# Patient Record
Sex: FEMALE | Race: WHITE | NOT HISPANIC OR LATINO | Employment: FULL TIME | URBAN - METROPOLITAN AREA
[De-identification: names, ages, dates, MRNs, and addresses within clinical notes are randomized per-mention and may not be internally consistent; named-entity substitution may affect disease eponyms.]

---

## 2021-03-22 ENCOUNTER — HOSPITAL ENCOUNTER (OUTPATIENT)
Facility: CLINIC | Age: 26
End: 2021-03-22
Admitting: OBSTETRICS & GYNECOLOGY
Payer: COMMERCIAL

## 2021-04-30 DIAGNOSIS — Z20.822 ENCOUNTER FOR LABORATORY TESTING FOR COVID-19 VIRUS: Primary | ICD-10-CM

## 2021-05-01 ENCOUNTER — HEALTH MAINTENANCE LETTER (OUTPATIENT)
Age: 26
End: 2021-05-01

## 2021-05-02 DIAGNOSIS — Z20.822 ENCOUNTER FOR LABORATORY TESTING FOR COVID-19 VIRUS: ICD-10-CM

## 2021-05-02 LAB
SARS-COV-2 RNA RESP QL NAA+PROBE: NORMAL
SPECIMEN SOURCE: NORMAL

## 2021-05-02 PROCEDURE — U0005 INFEC AGEN DETEC AMPLI PROBE: HCPCS | Performed by: OBSTETRICS & GYNECOLOGY

## 2021-05-02 PROCEDURE — U0003 INFECTIOUS AGENT DETECTION BY NUCLEIC ACID (DNA OR RNA); SEVERE ACUTE RESPIRATORY SYNDROME CORONAVIRUS 2 (SARS-COV-2) (CORONAVIRUS DISEASE [COVID-19]), AMPLIFIED PROBE TECHNIQUE, MAKING USE OF HIGH THROUGHPUT TECHNOLOGIES AS DESCRIBED BY CMS-2020-01-R: HCPCS | Performed by: OBSTETRICS & GYNECOLOGY

## 2021-05-03 LAB
LABORATORY COMMENT REPORT: NORMAL
SARS-COV-2 RNA RESP QL NAA+PROBE: NEGATIVE
SPECIMEN SOURCE: NORMAL

## 2021-05-04 ENCOUNTER — HOSPITAL ENCOUNTER (INPATIENT)
Facility: CLINIC | Age: 26
LOS: 4 days | Discharge: HOME | End: 2021-05-08
Attending: OBSTETRICS & GYNECOLOGY | Admitting: OBSTETRICS & GYNECOLOGY
Payer: COMMERCIAL

## 2021-05-04 DIAGNOSIS — Z98.891 S/P PRIMARY LOW TRANSVERSE C-SECTION: Primary | ICD-10-CM

## 2021-05-04 LAB
ABO + RH BLD: NORMAL
ABO + RH BLD: NORMAL
BASOPHILS # BLD AUTO: 0.1 10E9/L (ref 0–0.2)
BASOPHILS NFR BLD AUTO: 0.5 %
BLD GP AB SCN SERPL QL: NORMAL
BLOOD BANK CMNT PATIENT-IMP: NORMAL
DIFFERENTIAL METHOD BLD: ABNORMAL
EOSINOPHIL # BLD AUTO: 0.2 10E9/L (ref 0–0.7)
EOSINOPHIL NFR BLD AUTO: 1.9 %
ERYTHROCYTE [DISTWIDTH] IN BLOOD BY AUTOMATED COUNT: 12.8 % (ref 10–15)
HCT VFR BLD AUTO: 42.2 % (ref 35–47)
HGB BLD-MCNC: 14.4 G/DL (ref 11.7–15.7)
IMM GRANULOCYTES # BLD: 0.1 10E9/L (ref 0–0.4)
IMM GRANULOCYTES NFR BLD: 1 %
LYMPHOCYTES # BLD AUTO: 2 10E9/L (ref 0.8–5.3)
LYMPHOCYTES NFR BLD AUTO: 19.7 %
MCH RBC QN AUTO: 31.7 PG (ref 26.5–33)
MCHC RBC AUTO-ENTMCNC: 34.1 G/DL (ref 31.5–36.5)
MCV RBC AUTO: 93 FL (ref 78–100)
MONOCYTES # BLD AUTO: 0.6 10E9/L (ref 0–1.3)
MONOCYTES NFR BLD AUTO: 6.2 %
NEUTROPHILS # BLD AUTO: 7 10E9/L (ref 1.6–8.3)
NEUTROPHILS NFR BLD AUTO: 70.7 %
NRBC # BLD AUTO: 0 10*3/UL
NRBC BLD AUTO-RTO: 0 /100
PLATELET # BLD AUTO: 120 10E9/L (ref 150–450)
RBC # BLD AUTO: 4.54 10E12/L (ref 3.8–5.2)
SPECIMEN EXP DATE BLD: NORMAL
WBC # BLD AUTO: 9.9 10E9/L (ref 4–11)

## 2021-05-04 PROCEDURE — 86780 TREPONEMA PALLIDUM: CPT | Performed by: OBSTETRICS & GYNECOLOGY

## 2021-05-04 PROCEDURE — 120N000001 HC R&B MED SURG/OB

## 2021-05-04 PROCEDURE — 3E0P7VZ INTRODUCTION OF HORMONE INTO FEMALE REPRODUCTIVE, VIA NATURAL OR ARTIFICIAL OPENING: ICD-10-PCS | Performed by: OBSTETRICS & GYNECOLOGY

## 2021-05-04 PROCEDURE — 250N000013 HC RX MED GY IP 250 OP 250 PS 637: Performed by: OBSTETRICS & GYNECOLOGY

## 2021-05-04 PROCEDURE — 86900 BLOOD TYPING SEROLOGIC ABO: CPT | Performed by: OBSTETRICS & GYNECOLOGY

## 2021-05-04 PROCEDURE — 86901 BLOOD TYPING SEROLOGIC RH(D): CPT | Performed by: OBSTETRICS & GYNECOLOGY

## 2021-05-04 PROCEDURE — 36415 COLL VENOUS BLD VENIPUNCTURE: CPT | Performed by: OBSTETRICS & GYNECOLOGY

## 2021-05-04 PROCEDURE — 10907ZC DRAINAGE OF AMNIOTIC FLUID, THERAPEUTIC FROM PRODUCTS OF CONCEPTION, VIA NATURAL OR ARTIFICIAL OPENING: ICD-10-PCS | Performed by: OBSTETRICS & GYNECOLOGY

## 2021-05-04 PROCEDURE — 85025 COMPLETE CBC W/AUTO DIFF WBC: CPT | Performed by: OBSTETRICS & GYNECOLOGY

## 2021-05-04 PROCEDURE — 86850 RBC ANTIBODY SCREEN: CPT | Performed by: OBSTETRICS & GYNECOLOGY

## 2021-05-04 RX ORDER — PRENATAL VIT/IRON FUM/FOLIC AC 27MG-0.8MG
1 TABLET ORAL DAILY
COMMUNITY

## 2021-05-04 RX ORDER — OXYTOCIN 10 [USP'U]/ML
10 INJECTION, SOLUTION INTRAMUSCULAR; INTRAVENOUS
Status: DISCONTINUED | OUTPATIENT
Start: 2021-05-04 | End: 2021-05-06

## 2021-05-04 RX ORDER — RIBOFLAVIN (VITAMIN B2) 100 MG
100 TABLET ORAL 3 TIMES DAILY
COMMUNITY

## 2021-05-04 RX ORDER — ACETAMINOPHEN 325 MG/1
650 TABLET ORAL EVERY 4 HOURS PRN
Status: DISCONTINUED | OUTPATIENT
Start: 2021-05-04 | End: 2021-05-06

## 2021-05-04 RX ORDER — METHYLERGONOVINE MALEATE 0.2 MG/ML
200 INJECTION INTRAVENOUS
Status: COMPLETED | OUTPATIENT
Start: 2021-05-04 | End: 2021-05-06

## 2021-05-04 RX ORDER — IBUPROFEN 400 MG/1
800 TABLET, FILM COATED ORAL
Status: DISCONTINUED | OUTPATIENT
Start: 2021-05-04 | End: 2021-05-06

## 2021-05-04 RX ORDER — HYDROXYZINE HYDROCHLORIDE 50 MG/1
100 TABLET, FILM COATED ORAL
Status: DISCONTINUED | OUTPATIENT
Start: 2021-05-04 | End: 2021-05-08 | Stop reason: HOSPADM

## 2021-05-04 RX ORDER — NALOXONE HYDROCHLORIDE 0.4 MG/ML
0.4 INJECTION, SOLUTION INTRAMUSCULAR; INTRAVENOUS; SUBCUTANEOUS
Status: DISCONTINUED | OUTPATIENT
Start: 2021-05-04 | End: 2021-05-06

## 2021-05-04 RX ORDER — CARBOPROST TROMETHAMINE 250 UG/ML
250 INJECTION, SOLUTION INTRAMUSCULAR
Status: DISCONTINUED | OUTPATIENT
Start: 2021-05-04 | End: 2021-05-06

## 2021-05-04 RX ORDER — SODIUM CHLORIDE, SODIUM LACTATE, POTASSIUM CHLORIDE, CALCIUM CHLORIDE 600; 310; 30; 20 MG/100ML; MG/100ML; MG/100ML; MG/100ML
INJECTION, SOLUTION INTRAVENOUS CONTINUOUS
Status: DISCONTINUED | OUTPATIENT
Start: 2021-05-04 | End: 2021-05-06

## 2021-05-04 RX ORDER — OXYTOCIN/0.9 % SODIUM CHLORIDE 30/500 ML
100-340 PLASTIC BAG, INJECTION (ML) INTRAVENOUS CONTINUOUS PRN
Status: COMPLETED | OUTPATIENT
Start: 2021-05-04 | End: 2021-05-06

## 2021-05-04 RX ORDER — NALOXONE HYDROCHLORIDE 0.4 MG/ML
0.2 INJECTION, SOLUTION INTRAMUSCULAR; INTRAVENOUS; SUBCUTANEOUS
Status: DISCONTINUED | OUTPATIENT
Start: 2021-05-04 | End: 2021-05-06

## 2021-05-04 RX ORDER — TRANEXAMIC ACID 10 MG/ML
1 INJECTION, SOLUTION INTRAVENOUS EVERY 30 MIN PRN
Status: DISCONTINUED | OUTPATIENT
Start: 2021-05-04 | End: 2021-05-06

## 2021-05-04 RX ORDER — FENTANYL CITRATE 50 UG/ML
50-100 INJECTION, SOLUTION INTRAMUSCULAR; INTRAVENOUS
Status: DISCONTINUED | OUTPATIENT
Start: 2021-05-04 | End: 2021-05-06

## 2021-05-04 RX ORDER — ONDANSETRON 2 MG/ML
4 INJECTION INTRAMUSCULAR; INTRAVENOUS EVERY 6 HOURS PRN
Status: DISCONTINUED | OUTPATIENT
Start: 2021-05-04 | End: 2021-05-06

## 2021-05-04 RX ORDER — OXYCODONE AND ACETAMINOPHEN 5; 325 MG/1; MG/1
1 TABLET ORAL
Status: DISCONTINUED | OUTPATIENT
Start: 2021-05-04 | End: 2021-05-06

## 2021-05-04 RX ORDER — LIDOCAINE 40 MG/G
CREAM TOPICAL
Status: DISCONTINUED | OUTPATIENT
Start: 2021-05-04 | End: 2021-05-06

## 2021-05-04 RX ADMIN — DINOPROSTONE 10 MG: 10 INSERT VAGINAL at 21:47

## 2021-05-04 RX ADMIN — HYDROXYZINE HYDROCHLORIDE 100 MG: 50 TABLET, FILM COATED ORAL at 23:58

## 2021-05-05 ENCOUNTER — ANESTHESIA (OUTPATIENT)
Dept: OBGYN | Facility: CLINIC | Age: 26
End: 2021-05-05
Payer: COMMERCIAL

## 2021-05-05 ENCOUNTER — ANESTHESIA EVENT (OUTPATIENT)
Dept: OBGYN | Facility: CLINIC | Age: 26
End: 2021-05-05
Payer: COMMERCIAL

## 2021-05-05 LAB — T PALLIDUM AB SER QL: NONREACTIVE

## 2021-05-05 PROCEDURE — 120N000001 HC R&B MED SURG/OB

## 2021-05-05 PROCEDURE — 250N000011 HC RX IP 250 OP 636: Performed by: ANESTHESIOLOGY

## 2021-05-05 PROCEDURE — 258N000003 HC RX IP 258 OP 636: Performed by: ANESTHESIOLOGY

## 2021-05-05 PROCEDURE — 258N000003 HC RX IP 258 OP 636: Performed by: OBSTETRICS & GYNECOLOGY

## 2021-05-05 PROCEDURE — 3E033VJ INTRODUCTION OF OTHER HORMONE INTO PERIPHERAL VEIN, PERCUTANEOUS APPROACH: ICD-10-PCS | Performed by: OBSTETRICS & GYNECOLOGY

## 2021-05-05 PROCEDURE — 00HU33Z INSERTION OF INFUSION DEVICE INTO SPINAL CANAL, PERCUTANEOUS APPROACH: ICD-10-PCS | Performed by: ANESTHESIOLOGY

## 2021-05-05 PROCEDURE — 3E0R3BZ INTRODUCTION OF ANESTHETIC AGENT INTO SPINAL CANAL, PERCUTANEOUS APPROACH: ICD-10-PCS | Performed by: ANESTHESIOLOGY

## 2021-05-05 PROCEDURE — 250N000009 HC RX 250: Performed by: OBSTETRICS & GYNECOLOGY

## 2021-05-05 PROCEDURE — 250N000009 HC RX 250: Performed by: ANESTHESIOLOGY

## 2021-05-05 RX ORDER — OXYTOCIN/0.9 % SODIUM CHLORIDE 30/500 ML
1-24 PLASTIC BAG, INJECTION (ML) INTRAVENOUS CONTINUOUS
Status: DISCONTINUED | OUTPATIENT
Start: 2021-05-05 | End: 2021-05-06

## 2021-05-05 RX ORDER — ROPIVACAINE HYDROCHLORIDE 2 MG/ML
INJECTION, SOLUTION EPIDURAL; INFILTRATION; PERINEURAL PRN
Status: DISCONTINUED | OUTPATIENT
Start: 2021-05-05 | End: 2021-05-06

## 2021-05-05 RX ORDER — ROPIVACAINE HYDROCHLORIDE 2 MG/ML
10 INJECTION, SOLUTION EPIDURAL; INFILTRATION; PERINEURAL ONCE
Status: DISCONTINUED | OUTPATIENT
Start: 2021-05-05 | End: 2021-05-06

## 2021-05-05 RX ORDER — ONDANSETRON 2 MG/ML
4 INJECTION INTRAMUSCULAR; INTRAVENOUS EVERY 6 HOURS PRN
Status: DISCONTINUED | OUTPATIENT
Start: 2021-05-05 | End: 2021-05-06

## 2021-05-05 RX ORDER — FENTANYL CITRATE 50 UG/ML
100 INJECTION, SOLUTION INTRAMUSCULAR; INTRAVENOUS ONCE
Status: DISCONTINUED | OUTPATIENT
Start: 2021-05-05 | End: 2021-05-06

## 2021-05-05 RX ORDER — ONDANSETRON 4 MG/1
4 TABLET, ORALLY DISINTEGRATING ORAL EVERY 6 HOURS PRN
Status: DISCONTINUED | OUTPATIENT
Start: 2021-05-05 | End: 2021-05-06

## 2021-05-05 RX ORDER — NALBUPHINE HYDROCHLORIDE 10 MG/ML
2.5-5 INJECTION, SOLUTION INTRAMUSCULAR; INTRAVENOUS; SUBCUTANEOUS EVERY 6 HOURS PRN
Status: DISCONTINUED | OUTPATIENT
Start: 2021-05-05 | End: 2021-05-06

## 2021-05-05 RX ORDER — LIDOCAINE HYDROCHLORIDE AND EPINEPHRINE 15; 5 MG/ML; UG/ML
INJECTION, SOLUTION EPIDURAL PRN
Status: DISCONTINUED | OUTPATIENT
Start: 2021-05-05 | End: 2021-05-06

## 2021-05-05 RX ORDER — LIDOCAINE HYDROCHLORIDE AND EPINEPHRINE 15; 5 MG/ML; UG/ML
3 INJECTION, SOLUTION EPIDURAL
Status: DISCONTINUED | OUTPATIENT
Start: 2021-05-05 | End: 2021-05-06

## 2021-05-05 RX ORDER — LIDOCAINE 40 MG/G
CREAM TOPICAL
Status: DISCONTINUED | OUTPATIENT
Start: 2021-05-05 | End: 2021-05-06

## 2021-05-05 RX ORDER — FENTANYL CITRATE 50 UG/ML
INJECTION, SOLUTION INTRAMUSCULAR; INTRAVENOUS PRN
Status: DISCONTINUED | OUTPATIENT
Start: 2021-05-05 | End: 2021-05-06

## 2021-05-05 RX ORDER — EPHEDRINE SULFATE 50 MG/ML
5 INJECTION, SOLUTION INTRAMUSCULAR; INTRAVENOUS; SUBCUTANEOUS
Status: DISCONTINUED | OUTPATIENT
Start: 2021-05-05 | End: 2021-05-06

## 2021-05-05 RX ADMIN — SODIUM CHLORIDE, POTASSIUM CHLORIDE, SODIUM LACTATE AND CALCIUM CHLORIDE: 600; 310; 30; 20 INJECTION, SOLUTION INTRAVENOUS at 15:00

## 2021-05-05 RX ADMIN — Medication 12 ML/HR: at 16:31

## 2021-05-05 RX ADMIN — LIDOCAINE HYDROCHLORIDE AND EPINEPHRINE 3 ML: 15; 5 INJECTION, SOLUTION EPIDURAL at 16:29

## 2021-05-05 RX ADMIN — FENTANYL CITRATE 100 MCG: 50 INJECTION, SOLUTION INTRAMUSCULAR; INTRAVENOUS at 16:31

## 2021-05-05 RX ADMIN — Medication 12 ML/HR: at 23:08

## 2021-05-05 RX ADMIN — SODIUM CHLORIDE, POTASSIUM CHLORIDE, SODIUM LACTATE AND CALCIUM CHLORIDE 1000 ML: 600; 310; 30; 20 INJECTION, SOLUTION INTRAVENOUS at 17:24

## 2021-05-05 RX ADMIN — ROPIVACAINE HYDROCHLORIDE 4 ML: 2 INJECTION, SOLUTION EPIDURAL; INFILTRATION at 16:33

## 2021-05-05 RX ADMIN — ROPIVACAINE HYDROCHLORIDE 4 ML: 2 INJECTION, SOLUTION EPIDURAL; INFILTRATION at 16:31

## 2021-05-05 RX ADMIN — Medication 2 MILLI-UNITS/MIN: at 09:38

## 2021-05-05 RX ADMIN — SODIUM CHLORIDE, POTASSIUM CHLORIDE, SODIUM LACTATE AND CALCIUM CHLORIDE: 600; 310; 30; 20 INJECTION, SOLUTION INTRAVENOUS at 09:37

## 2021-05-05 NOTE — H&P
H&P Update    Prenatal chart and nursing notes reviewed, no changes.    24yo  at 41w1d admitted for MIL due to postdates. Pregnancy has been entirely uncomplicated, GBS neg. Rh neg, received Rhogham at 28wks. EFW 8-13. Received Cervidil o/n, cvx this morning 3/70/-2. AROM with clear fluid. Will start pitocin in 1 hour. FHTs Category I. Anticipate .    Treva Vo MD

## 2021-05-05 NOTE — ANESTHESIA PREPROCEDURE EVALUATION
Anesthesia Pre-Procedure Evaluation    Patient: Jhony Daniel   MRN: [id number] : 1995        Preoperative Diagnosis: * No pre-op diagnosis entered *   Procedure : * No procedures listed *     History reviewed. No pertinent past medical history.   Past Surgical History:   Procedure Laterality Date     HEAD & NECK SURGERY      [place] teeth      Allergies   Allergen Reactions     Sulfa Drugs Shortness Of Breath     Amoxicillin Hives     Penicillins Hives      Social History     Tobacco Use     Smoking status: Never Smoker     Smokeless tobacco: Never Used   Substance Use Topics     Alcohol use: Not Currently      Wt Readings from Last 1 Encounters:   No data found for Wt        Anesthesia Evaluation            ROS/MED HX  ENT/Pulmonary:  - neg pulmonary ROS     Neurologic:  - neg neurologic ROS     Cardiovascular:  - neg cardiovascular ROS     METS/Exercise Tolerance:     Hematologic:  - neg hematologic  ROS     Musculoskeletal:  - neg musculoskeletal ROS     GI/Hepatic:  - neg GI/hepatic ROS     Renal/Genitourinary: Comment: Failure to progress - going to C/S      Endo:  - neg endo ROS     Psychiatric/Substance Use:  - neg psychiatric ROS     Infectious Disease:  - neg infectious disease ROS     Malignancy:  - neg malignancy ROS     Other:  - neg other ROS          Physical Exam    Airway        Mallampati: II   TM distance: > 3 FB   Neck ROM: full   Mouth opening: > 3 cm    Respiratory Devices and Support         Dental  no notable dental history         Cardiovascular   cardiovascular exam normal          Pulmonary   pulmonary exam normal                OUTSIDE LABS:  CBC:   Lab Results   Component Value Date    WBC 9.9 2021    HGB 14.4 2021    HCT 42.2 2021     (L) 2021     BMP: No results found for: NA, POTASSIUM, CHLORIDE, CO2, BUN, CR, GLC  COAGS: No results found for: PTT, INR, FIBR  POC: No results found for: BGM, HCG, HCGS  HEPATIC: No results found for: ALBUMIN,  PROTTOTAL, ALT, AST, GGT, ALKPHOS, BILITOTAL, BILIDIRECT, MATEO  OTHER: No results found for: PH, LACT, A1C, WILLIAM, PHOS, MAG, LIPASE, AMYLASE, TSH, T4, T3, CRP, SED    Anesthesia Plan    ASA Status:  2      Anesthesia Type: Epidural.              Consents    Anesthesia Plan(s) and associated risks, benefits, and realistic alternatives discussed. Questions answered and patient/representative(s) expressed understanding.     - Discussed with:  Patient         Postoperative Care            Comments:                Hugo French MD

## 2021-05-05 NOTE — PROGRESS NOTES
OB Progress Note    Very comfortable w/ epidural. Continues to leak clear fluid. Pitocin at 8mU/min. FHTs Category I. Now /-2. Will continue to increase pitocin and plan position changes on peanut ball. D/w Dr. Pandey who is on call [facility]. Anticipate .    Treva Vo MD

## 2021-05-05 NOTE — PLAN OF CARE
Pt is a  at 41w1d presenting for cervical ripening overnight for post-dates. Blood type O-, COVID neg, Hep B neg, Rubella immune, GBS neg. Resting comfortably overnight. Contractions are longer lasting at baseline;  seconds. Maternal VSS. Desires a labor epidural for pain relief.    Expecting a boy, plans to breastfeed. Cat I FHR tracing.

## 2021-05-05 NOTE — ANESTHESIA PROCEDURE NOTES
Epidural catheter Procedure Note  Pre-Procedure   Staff -        Anesthesiologist:  Laura Duval MD       Performed By: anesthesiologist       Location: OB       Pre-Anesthestic Checklist: patient identified, IV checked, site marked, risks and benefits discussed, informed consent, monitors and equipment checked, pre-op evaluation and at physician/surgeon's request  Timeout:       Correct Patient: Yes        Correct Procedure: Yes        Correct Site: Yes        Correct Position: Yes   Procedure Documentation  Procedure: epidural catheter       Patient Position: sitting       Skin prep: Betadine       Local skin infiltrated with 1 mL of 1% lidocaine.        Insertion Site: L2-3. (midline approach).       Technique: LORT saline and LORT air        Needle Type: Touhy needle       Needle Gauge: 17.        Needle Length (Inches): 3.5        Catheter: 19 G.         Catheter threaded easily.        # of attempts: 1 and  # of redirects:     Assessment/Narrative         Paresthesias: No.      Test dose of 3 mL lidocaine 1.5% w/ 1:200,000 epinephrine at.         Test dose negative, 3 minutes after injection, for signs of intravascular, subdural, or intrathecal injection.       Insertion/Infusion Method: LORT saline and LORT air       Aspiration negative for Heme or CSF via Epidural Catheter.    Comments:  Pre-procedure time out completed. Patient in sitting position, the lumbar spine was prepped and draped in sterile fashion. The L2/L3 interspace was identified and local anesthetic was injected for local skin infiltration. A 17 G touhy needle was advanced to the epidural space which was confirmed with the loss of resistance technique at 4 cm. A catheter was then advanced easily into the epidural space. The catheter was left at 8 cm at the skin. Negative aspiration of blood and CSF was confirmed. A test dose of 1.5% lidocaine with 1:200,000 epinephrine was injected through the catheter and was negative for intravascular  injection. The site was covered with sterile tegaderm and the catheter was secured with tape.

## 2021-05-05 NOTE — PLAN OF CARE
ROLY Steele arrived to bedside for epidural placement. Pt sitting at edge of bed. Consent signed and questions answered. Timeout done. Epidural placed without complications. Pt assisted to L tilt and blood pressure's cycled per orders. Pt felt good pain relief within 15 min of pump starting.

## 2021-05-05 NOTE — PLAN OF CARE
Dr. Treva Vo at bedside at 0830 to assess patient and discuss plan of care.  SVE done. Cervix 3/70/-2.  Cervidil pulled out at that time.  Pt agrees with plan to start oxytocin.    Oxytocin started for induction at 0935.  Pt already josselyn frequently, every 2-3, but mild in strength.  Oxytocin started at 2 mu.    Category 1 tracing noted.

## 2021-05-05 NOTE — PLAN OF CARE
Patient is doing well.  Pt states that her contractions are getting more intense.  Contractions occurring every 2-3 minutes. Discussed epidural.  Pt aware that she can have one any time now.  Pt is changing her position every 30 minutes or so on her own, from sitting to standing, hands and knees, birthing ball, etc.  She is breathing through contractions and able to relax in between.

## 2021-05-06 PROBLEM — Z98.891 S/P PRIMARY LOW TRANSVERSE C-SECTION: Status: ACTIVE | Noted: 2021-05-06

## 2021-05-06 LAB
BLOOD BANK CMNT PATIENT-IMP: NORMAL
BLOOD BANK CMNT PATIENT-IMP: NORMAL

## 2021-05-06 PROCEDURE — 258N000003 HC RX IP 258 OP 636: Performed by: OBSTETRICS & GYNECOLOGY

## 2021-05-06 PROCEDURE — 250N000011 HC RX IP 250 OP 636: Performed by: OBSTETRICS & GYNECOLOGY

## 2021-05-06 PROCEDURE — 250N000009 HC RX 250: Performed by: OBSTETRICS & GYNECOLOGY

## 2021-05-06 PROCEDURE — 120N000012 HC R&B POSTPARTUM

## 2021-05-06 PROCEDURE — 710N000009 HC RECOVERY PHASE 1, LEVEL 1, PER MIN: Performed by: OBSTETRICS & GYNECOLOGY

## 2021-05-06 PROCEDURE — 86900 BLOOD TYPING SEROLOGIC ABO: CPT | Performed by: OBSTETRICS & GYNECOLOGY

## 2021-05-06 PROCEDURE — 250N000013 HC RX MED GY IP 250 OP 250 PS 637: Performed by: OBSTETRICS & GYNECOLOGY

## 2021-05-06 PROCEDURE — 360N000076 HC SURGERY LEVEL 3, PER MIN: Performed by: OBSTETRICS & GYNECOLOGY

## 2021-05-06 PROCEDURE — 370N000017 HC ANESTHESIA TECHNICAL FEE, PER MIN: Performed by: OBSTETRICS & GYNECOLOGY

## 2021-05-06 PROCEDURE — 272N000001 HC OR GENERAL SUPPLY STERILE: Performed by: OBSTETRICS & GYNECOLOGY

## 2021-05-06 PROCEDURE — 250N000011 HC RX IP 250 OP 636: Performed by: ANESTHESIOLOGY

## 2021-05-06 PROCEDURE — 250N000009 HC RX 250: Performed by: NURSE ANESTHETIST, CERTIFIED REGISTERED

## 2021-05-06 PROCEDURE — 258N000003 HC RX IP 258 OP 636: Performed by: NURSE ANESTHETIST, CERTIFIED REGISTERED

## 2021-05-06 PROCEDURE — 250N000011 HC RX IP 250 OP 636: Performed by: NURSE ANESTHETIST, CERTIFIED REGISTERED

## 2021-05-06 RX ORDER — METOCLOPRAMIDE HYDROCHLORIDE 5 MG/ML
10 INJECTION INTRAMUSCULAR; INTRAVENOUS EVERY 6 HOURS PRN
Status: DISCONTINUED | OUTPATIENT
Start: 2021-05-06 | End: 2021-05-08 | Stop reason: HOSPADM

## 2021-05-06 RX ORDER — NALBUPHINE HYDROCHLORIDE 10 MG/ML
2.5-5 INJECTION, SOLUTION INTRAMUSCULAR; INTRAVENOUS; SUBCUTANEOUS EVERY 6 HOURS PRN
Status: DISCONTINUED | OUTPATIENT
Start: 2021-05-06 | End: 2021-05-06

## 2021-05-06 RX ORDER — MODIFIED LANOLIN
OINTMENT (GRAM) TOPICAL
Status: DISCONTINUED | OUTPATIENT
Start: 2021-05-06 | End: 2021-05-08 | Stop reason: HOSPADM

## 2021-05-06 RX ORDER — PROCHLORPERAZINE 25 MG
25 SUPPOSITORY, RECTAL RECTAL EVERY 12 HOURS PRN
Status: DISCONTINUED | OUTPATIENT
Start: 2021-05-06 | End: 2021-05-08 | Stop reason: HOSPADM

## 2021-05-06 RX ORDER — AMOXICILLIN 250 MG
2 CAPSULE ORAL 2 TIMES DAILY
Status: DISCONTINUED | OUTPATIENT
Start: 2021-05-06 | End: 2021-05-08 | Stop reason: HOSPADM

## 2021-05-06 RX ORDER — METHYLERGONOVINE MALEATE 0.2 MG/ML
200 INJECTION INTRAVENOUS
Status: DISCONTINUED | OUTPATIENT
Start: 2021-05-06 | End: 2021-05-08 | Stop reason: HOSPADM

## 2021-05-06 RX ORDER — KETOROLAC TROMETHAMINE 30 MG/ML
INJECTION, SOLUTION INTRAMUSCULAR; INTRAVENOUS
Status: DISCONTINUED
Start: 2021-05-06 | End: 2021-05-06 | Stop reason: HOSPADM

## 2021-05-06 RX ORDER — MISOPROSTOL 200 UG/1
800 TABLET ORAL
Status: DISCONTINUED | OUTPATIENT
Start: 2021-05-06 | End: 2021-05-08 | Stop reason: HOSPADM

## 2021-05-06 RX ORDER — ACETAMINOPHEN 325 MG/1
975 TABLET ORAL EVERY 6 HOURS
Status: DISCONTINUED | OUTPATIENT
Start: 2021-05-06 | End: 2021-05-08 | Stop reason: HOSPADM

## 2021-05-06 RX ORDER — NALOXONE HYDROCHLORIDE 0.4 MG/ML
0.2 INJECTION, SOLUTION INTRAMUSCULAR; INTRAVENOUS; SUBCUTANEOUS
Status: DISCONTINUED | OUTPATIENT
Start: 2021-05-06 | End: 2021-05-06

## 2021-05-06 RX ORDER — AZITHROMYCIN 500 MG/1
500 INJECTION, POWDER, LYOPHILIZED, FOR SOLUTION INTRAVENOUS
Status: DISCONTINUED | OUTPATIENT
Start: 2021-05-06 | End: 2021-05-06

## 2021-05-06 RX ORDER — MORPHINE SULFATE 1 MG/ML
INJECTION, SOLUTION EPIDURAL; INTRATHECAL; INTRAVENOUS PRN
Status: DISCONTINUED | OUTPATIENT
Start: 2021-05-06 | End: 2021-05-06

## 2021-05-06 RX ORDER — NALOXONE HYDROCHLORIDE 0.4 MG/ML
0.4 INJECTION, SOLUTION INTRAMUSCULAR; INTRAVENOUS; SUBCUTANEOUS
Status: DISCONTINUED | OUTPATIENT
Start: 2021-05-06 | End: 2021-05-06

## 2021-05-06 RX ORDER — LIDOCAINE 40 MG/G
CREAM TOPICAL
Status: DISCONTINUED | OUTPATIENT
Start: 2021-05-06 | End: 2021-05-06

## 2021-05-06 RX ORDER — CEFAZOLIN SODIUM 1 G/3ML
1 INJECTION, POWDER, FOR SOLUTION INTRAMUSCULAR; INTRAVENOUS SEE ADMIN INSTRUCTIONS
Status: DISCONTINUED | OUTPATIENT
Start: 2021-05-06 | End: 2021-05-06

## 2021-05-06 RX ORDER — KETOROLAC TROMETHAMINE 30 MG/ML
30 INJECTION, SOLUTION INTRAMUSCULAR; INTRAVENOUS EVERY 6 HOURS
Status: COMPLETED | OUTPATIENT
Start: 2021-05-06 | End: 2021-05-06

## 2021-05-06 RX ORDER — LIDOCAINE 40 MG/G
CREAM TOPICAL
Status: DISCONTINUED | OUTPATIENT
Start: 2021-05-06 | End: 2021-05-08 | Stop reason: HOSPADM

## 2021-05-06 RX ORDER — HYDROCORTISONE 2.5 %
CREAM (GRAM) TOPICAL 3 TIMES DAILY PRN
Status: DISCONTINUED | OUTPATIENT
Start: 2021-05-06 | End: 2021-05-08 | Stop reason: HOSPADM

## 2021-05-06 RX ORDER — BISACODYL 10 MG
10 SUPPOSITORY, RECTAL RECTAL DAILY PRN
Status: DISCONTINUED | OUTPATIENT
Start: 2021-05-08 | End: 2021-05-08 | Stop reason: HOSPADM

## 2021-05-06 RX ORDER — ONDANSETRON 2 MG/ML
4 INJECTION INTRAMUSCULAR; INTRAVENOUS EVERY 6 HOURS PRN
Status: DISCONTINUED | OUTPATIENT
Start: 2021-05-06 | End: 2021-05-08 | Stop reason: HOSPADM

## 2021-05-06 RX ORDER — SODIUM CHLORIDE, SODIUM LACTATE, POTASSIUM CHLORIDE, CALCIUM CHLORIDE 600; 310; 30; 20 MG/100ML; MG/100ML; MG/100ML; MG/100ML
INJECTION, SOLUTION INTRAVENOUS CONTINUOUS
Status: DISCONTINUED | OUTPATIENT
Start: 2021-05-06 | End: 2021-05-06

## 2021-05-06 RX ORDER — CARBOPROST TROMETHAMINE 250 UG/ML
250 INJECTION, SOLUTION INTRAMUSCULAR
Status: DISCONTINUED | OUTPATIENT
Start: 2021-05-06 | End: 2021-05-08 | Stop reason: HOSPADM

## 2021-05-06 RX ORDER — LIDOCAINE HYDROCHLORIDE 20 MG/ML
INJECTION, SOLUTION EPIDURAL; INFILTRATION; INTRACAUDAL; PERINEURAL PRN
Status: DISCONTINUED | OUTPATIENT
Start: 2021-05-06 | End: 2021-05-06

## 2021-05-06 RX ORDER — SIMETHICONE 80 MG
80 TABLET,CHEWABLE ORAL 4 TIMES DAILY PRN
Status: DISCONTINUED | OUTPATIENT
Start: 2021-05-06 | End: 2021-05-08 | Stop reason: HOSPADM

## 2021-05-06 RX ORDER — DIPHENHYDRAMINE HCL 25 MG
25 CAPSULE ORAL EVERY 6 HOURS PRN
Status: DISCONTINUED | OUTPATIENT
Start: 2021-05-06 | End: 2021-05-08 | Stop reason: HOSPADM

## 2021-05-06 RX ORDER — EPHEDRINE SULFATE 50 MG/ML
5 INJECTION, SOLUTION INTRAMUSCULAR; INTRAVENOUS; SUBCUTANEOUS
Status: DISCONTINUED | OUTPATIENT
Start: 2021-05-06 | End: 2021-05-06

## 2021-05-06 RX ORDER — OXYTOCIN/0.9 % SODIUM CHLORIDE 30/500 ML
340 PLASTIC BAG, INJECTION (ML) INTRAVENOUS CONTINUOUS PRN
Status: DISCONTINUED | OUTPATIENT
Start: 2021-05-06 | End: 2021-05-08 | Stop reason: HOSPADM

## 2021-05-06 RX ORDER — IBUPROFEN 400 MG/1
800 TABLET, FILM COATED ORAL EVERY 6 HOURS
Status: DISCONTINUED | OUTPATIENT
Start: 2021-05-06 | End: 2021-05-08 | Stop reason: HOSPADM

## 2021-05-06 RX ORDER — OXYTOCIN/0.9 % SODIUM CHLORIDE 30/500 ML
100 PLASTIC BAG, INJECTION (ML) INTRAVENOUS CONTINUOUS
Status: DISCONTINUED | OUTPATIENT
Start: 2021-05-06 | End: 2021-05-08 | Stop reason: HOSPADM

## 2021-05-06 RX ORDER — CITRIC ACID/SODIUM CITRATE 334-500MG
30 SOLUTION, ORAL ORAL
Status: COMPLETED | OUTPATIENT
Start: 2021-05-06 | End: 2021-05-06

## 2021-05-06 RX ORDER — ACETAMINOPHEN 325 MG/1
975 TABLET ORAL ONCE
Status: COMPLETED | OUTPATIENT
Start: 2021-05-06 | End: 2021-05-06

## 2021-05-06 RX ORDER — DIPHENHYDRAMINE HYDROCHLORIDE 50 MG/ML
25 INJECTION INTRAMUSCULAR; INTRAVENOUS EVERY 6 HOURS PRN
Status: DISCONTINUED | OUTPATIENT
Start: 2021-05-06 | End: 2021-05-08 | Stop reason: HOSPADM

## 2021-05-06 RX ORDER — CEFAZOLIN SODIUM 2 G/100ML
2 INJECTION, SOLUTION INTRAVENOUS
Status: COMPLETED | OUTPATIENT
Start: 2021-05-06 | End: 2021-05-06

## 2021-05-06 RX ORDER — OXYTOCIN/0.9 % SODIUM CHLORIDE 30/500 ML
PLASTIC BAG, INJECTION (ML) INTRAVENOUS
Status: DISCONTINUED
Start: 2021-05-06 | End: 2021-05-06 | Stop reason: HOSPADM

## 2021-05-06 RX ORDER — OXYCODONE HYDROCHLORIDE 5 MG/1
5 TABLET ORAL EVERY 4 HOURS PRN
Status: DISCONTINUED | OUTPATIENT
Start: 2021-05-06 | End: 2021-05-08 | Stop reason: HOSPADM

## 2021-05-06 RX ORDER — TRANEXAMIC ACID 10 MG/ML
1 INJECTION, SOLUTION INTRAVENOUS EVERY 30 MIN PRN
Status: DISCONTINUED | OUTPATIENT
Start: 2021-05-06 | End: 2021-05-08 | Stop reason: HOSPADM

## 2021-05-06 RX ORDER — DEXTROSE, SODIUM CHLORIDE, SODIUM LACTATE, POTASSIUM CHLORIDE, AND CALCIUM CHLORIDE 5; .6; .31; .03; .02 G/100ML; G/100ML; G/100ML; G/100ML; G/100ML
INJECTION, SOLUTION INTRAVENOUS CONTINUOUS
Status: DISCONTINUED | OUTPATIENT
Start: 2021-05-06 | End: 2021-05-08 | Stop reason: HOSPADM

## 2021-05-06 RX ORDER — AMOXICILLIN 250 MG
1 CAPSULE ORAL 2 TIMES DAILY
Status: DISCONTINUED | OUTPATIENT
Start: 2021-05-06 | End: 2021-05-08 | Stop reason: HOSPADM

## 2021-05-06 RX ORDER — OXYTOCIN 10 [USP'U]/ML
10 INJECTION, SOLUTION INTRAMUSCULAR; INTRAVENOUS
Status: DISCONTINUED | OUTPATIENT
Start: 2021-05-06 | End: 2021-05-08 | Stop reason: HOSPADM

## 2021-05-06 RX ADMIN — ACETAMINOPHEN 975 MG: 325 TABLET, FILM COATED ORAL at 19:34

## 2021-05-06 RX ADMIN — SODIUM CHLORIDE, POTASSIUM CHLORIDE, SODIUM LACTATE AND CALCIUM CHLORIDE: 600; 310; 30; 20 INJECTION, SOLUTION INTRAVENOUS at 00:40

## 2021-05-06 RX ADMIN — PHENYLEPHRINE HYDROCHLORIDE 0.5 MCG/KG/MIN: 10 INJECTION INTRAVENOUS at 00:28

## 2021-05-06 RX ADMIN — SODIUM CITRATE AND CITRIC ACID MONOHYDRATE 30 ML: 500; 334 SOLUTION ORAL at 00:20

## 2021-05-06 RX ADMIN — IBUPROFEN 800 MG: 400 TABLET ORAL at 19:35

## 2021-05-06 RX ADMIN — ACETAMINOPHEN 975 MG: 325 TABLET, FILM COATED ORAL at 00:19

## 2021-05-06 RX ADMIN — KETOROLAC TROMETHAMINE 30 MG: 30 INJECTION, SOLUTION INTRAMUSCULAR; INTRAVENOUS at 02:21

## 2021-05-06 RX ADMIN — DIPHENHYDRAMINE HYDROCHLORIDE 25 MG: 50 INJECTION, SOLUTION INTRAMUSCULAR; INTRAVENOUS at 04:30

## 2021-05-06 RX ADMIN — Medication 100 ML/HR: at 06:57

## 2021-05-06 RX ADMIN — CEFAZOLIN SODIUM 2 G: 2 INJECTION, SOLUTION INTRAVENOUS at 00:30

## 2021-05-06 RX ADMIN — KETOROLAC TROMETHAMINE 30 MG: 30 INJECTION, SOLUTION INTRAMUSCULAR; INTRAVENOUS at 14:14

## 2021-05-06 RX ADMIN — TRANEXAMIC ACID 1 G: 10 INJECTION, SOLUTION INTRAVENOUS at 00:49

## 2021-05-06 RX ADMIN — KETOROLAC TROMETHAMINE 30 MG: 30 INJECTION, SOLUTION INTRAMUSCULAR; INTRAVENOUS at 08:17

## 2021-05-06 RX ADMIN — OXYCODONE HYDROCHLORIDE 5 MG: 5 TABLET ORAL at 22:01

## 2021-05-06 RX ADMIN — PHENYLEPHRINE HYDROCHLORIDE 100 MCG: 10 INJECTION INTRAVENOUS at 00:43

## 2021-05-06 RX ADMIN — METHYLERGONOVINE MALEATE 200 MCG: 0.2 INJECTION INTRAVENOUS at 00:49

## 2021-05-06 RX ADMIN — ONDANSETRON 4 MG: 2 INJECTION INTRAMUSCULAR; INTRAVENOUS at 00:48

## 2021-05-06 RX ADMIN — MORPHINE SULFATE 2 MG: 1 INJECTION, SOLUTION EPIDURAL; INTRATHECAL; INTRAVENOUS at 00:50

## 2021-05-06 RX ADMIN — Medication 999 ML/HR: at 00:45

## 2021-05-06 RX ADMIN — LIDOCAINE HYDROCHLORIDE 10 ML: 20 INJECTION, SOLUTION EPIDURAL; INFILTRATION; INTRACAUDAL; PERINEURAL at 00:21

## 2021-05-06 RX ADMIN — PHENYLEPHRINE HYDROCHLORIDE 100 MCG: 10 INJECTION INTRAVENOUS at 00:54

## 2021-05-06 RX ADMIN — SENNOSIDES AND DOCUSATE SODIUM 1 TABLET: 8.6; 5 TABLET ORAL at 19:35

## 2021-05-06 RX ADMIN — SENNOSIDES AND DOCUSATE SODIUM 1 TABLET: 8.6; 5 TABLET ORAL at 08:06

## 2021-05-06 RX ADMIN — ACETAMINOPHEN 975 MG: 325 TABLET, FILM COATED ORAL at 14:14

## 2021-05-06 RX ADMIN — OXYCODONE HYDROCHLORIDE 5 MG: 5 TABLET ORAL at 16:07

## 2021-05-06 RX ADMIN — SIMETHICONE 80 MG: 80 TABLET, CHEWABLE ORAL at 22:05

## 2021-05-06 RX ADMIN — PHENYLEPHRINE HYDROCHLORIDE 200 MCG: 10 INJECTION INTRAVENOUS at 00:58

## 2021-05-06 RX ADMIN — LIDOCAINE HYDROCHLORIDE 5 ML: 20 INJECTION, SOLUTION EPIDURAL; INFILTRATION; INTRACAUDAL; PERINEURAL at 00:27

## 2021-05-06 RX ADMIN — SODIUM CHLORIDE, SODIUM LACTATE, POTASSIUM CHLORIDE, CALCIUM CHLORIDE AND DEXTROSE MONOHYDRATE: 5; 600; 310; 30; 20 INJECTION, SOLUTION INTRAVENOUS at 11:47

## 2021-05-06 RX ADMIN — ACETAMINOPHEN 975 MG: 325 TABLET, FILM COATED ORAL at 08:06

## 2021-05-06 NOTE — PLAN OF CARE
Vital signs stable. Postpartum assessment WDL. Incision SUZAN. Pain controlled with tylenol and toradol. Patient ambulating with assist. Patient denies passing gas. Breastfeeding on cue with some assist. Patient and infant bonding well. Will continue with current plan of care.

## 2021-05-06 NOTE — PLAN OF CARE
Data: Jhony Daniel transferred to [id number] via cart at 0340. Baby transferred via parent's arms.  Action: Receiving unit notified of transfer: Yes. Patient and family notified of room change. Report given to CRISTOPHER Stern RN at 0340. Belongings sent to receiving unit. Accompanied by Registered Nurse. Oriented patient to surroundings. Call light within reach. ID bands double-checked with receiving RN.  Response: Patient tolerated transfer and is stable.

## 2021-05-06 NOTE — PROCEDURES
C/S OP REPORT      Preoperative Diagnosis:   1. 25 year old  at 41+1wks  2. Failure to Progress with arrest of dilation at 7cm      Postoperative Diagnosis:  1. Same  2. Delivery of male infant        Procedure: Primary Low Transverse  section       Surgeon: Lisa Pandey DO      Assistants: TESS Mcintosh      Implants/grafts: None      Specimen: None      Complications: none      Condition: Stable      Anesthesia: Epidural      Findings: Normal appearing BL ovaries and fallopian tubes. Normal appearing uterus.  Male infant in cephalic presentation weighing 8#4oz, Apgars of 8/9.       Indications:  25 year old   presented for MIOL and had failure to progress.      During a preoperative consultation the patient was counseled about the risks and benefits of the procedure and the alternatives to  section. Patient was then further consented about the risks of the procedure to include but not limited to bleeding, infection, injury to other organs and anesthesia and advised that if any one of these things happen she may need further surgery. Patient is also advised of the rare risk of hysterectomy secondary to bleeding. Patient was given the opportunity to have her questions answered prior to the procedure.      OPERATIVE INDICATION AND DESCRIPTION   The patient was taken to the operating room and spinal anesthesia was placed. I placed the fetal pillow with 180ml of saline into the vagina. The patient was prepped and draped in the normal sterile fashion in dorsal supine position with a leftward tilt.      After adequate anesthesia, a horizontal skin incision was made and then carried down to the fascia, which was incised horizontally.  The rectus muscles were freed from the overlying fascia and  in the midline, and the anterior peritoneum opened avoiding the bladder.  A low segment transverse uterine incision was made.The infant was delivered in the cepahlic presentation without  difficulty, and handed off to the team in attendance after a 60 second cord clamping delay.  The infant did well.The placenta delivered with external uterine massage.  The uterus was removed from the abdomen and closed in two layers.  The first layer using 0-Vicryl suture in a running locked fashion.  The second layer using 0-Monocryl in an imbricating fashion was placed incorporating the bladder flap peritoneum with care to avoid injury to the bladder.       There was no extra bleeding noted at any time.  The pelvis was inspected and suction irrigated. The uterus was hemostatic and returned to the abdomen.  The uterus was again inspected and was hemostatic. The anterior peritoneum was hemostatic and closed in a running fashion with 3-0 vicryl suture.  The rectus muscles were approximated with interrupted 0 vicryl sutures placed approximately 2 cm apart and were made hemostatic with electrocautery.  The fascia was closed in running fashion with #0 Vicryl.  The subcutaneous tissue was irrigated and then was closed with 2-0 plain in a running fashion. I irrigated the skin and made the tissue hemostatic with electrocautery. The skin was closed with staples.  Steri strips and a sterile dressing was applied.  Sponge and needle counts were correct.      Quantitative Blood Loss  680.  The patient tolerated the procedure well and was taken to the recovery area in good condition.      Lisa Pandey DO

## 2021-05-06 NOTE — ANESTHESIA POSTPROCEDURE EVALUATION
Patient: Jhony Daniel    Procedure(s):   SECTION    Diagnosis:* No pre-op diagnosis entered *  Diagnosis Additional Information: No value filed.    Anesthesia Type:  Epidural    Note:  Disposition: Inpatient   Postop Pain Control: Uneventful            Sign Out: Well controlled pain   PONV: No   Neuro/Psych: Uneventful            Sign Out: Acceptable/Baseline neuro status   Airway/Respiratory: Uneventful            Sign Out: Acceptable/Baseline resp. status   CV/Hemodynamics: Uneventful            Sign Out: Acceptable CV status; No obvious hypovolemia; No obvious fluid overload   Other NRE: NONE   DID A NON-ROUTINE EVENT OCCUR? No           Last vitals:  Vitals:    21 2345 21 0125 21 0140   BP: 106/51 120/70 94/54   Resp:  16 16   Temp:   37.3  C (99.1  F)   SpO2:  98% 99%       Last vitals prior to Anesthesia Care Transfer:  CRNA VITALS  2021 0053 - 2021 0153      2021             Resp Rate (set):  10          Electronically Signed By: Hugo Knapp DO, DO  May 6, 2021  2:11 AM

## 2021-05-06 NOTE — ANESTHESIA CARE TRANSFER NOTE
Patient: Jhony Daniel    Procedure(s):   SECTION    Diagnosis: * No pre-op diagnosis entered *  Diagnosis Additional Information: No value filed.    Anesthesia Type:   Epidural     Note:    Oropharynx: oropharynx clear of all foreign objects  Level of Consciousness: awake  Oxygen Supplementation: room air    Independent Airway: airway patency satisfactory and stable  Dentition: dentition unchanged  Vital Signs Stable: post-procedure vital signs reviewed and stable  Report to RN Given: handoff report given  Patient transferred to: PACU    Handoff Report: Identifed the Patient, Identified the Reponsible Provider, Reviewed the pertinent medical history, Discussed the surgical course, Reviewed Intra-OP anesthesia mangement and issues during anesthesia, Set expectations for post-procedure period and Allowed opportunity for questions and acknowledgement of understanding      Vitals: (Last set prior to Anesthesia Care Transfer)  CRNA VITALS  2021 0053 - 2021 0134      2021             Resp Rate (set):  10        Electronically Signed By: KENNETH Jerry CRNA  May 6, 2021  1:34 AM

## 2021-05-06 NOTE — PROGRESS NOTES
Progress Note:  Delivery    SUBJECTIVE  POD#0, s/p PLTCS at 41+2 weeks gestation due to arrest of dilation.  Doing well this morning.  Surgery was early this morning so she has not yet ambulated.  Pain is well controlled.  She is tolerating some oral intake.  Baby is in the room with her.  She is breast feeding.  Lochia is minimal.  No other concerns or questions.    OBJECTIVE  /64 (BP Location: Left arm)   Pulse 80   Temp 97.4  F (36.3  C) (Axillary)   Resp 18   LMP 2020 (Approximate)   SpO2 100%   Breastfeeding Unknown   General: Alert and pleasant  Abd: Soft, nontender, fundus firm below the umbilicus  Incision: Bandage c/d/i  Ext: No significant edema or calf tenderness    Recent Results (from the past 2016 hour(s))   Asymptomatic COVID-19 Virus (Coronavirus) by PCR    Collection Time: 21 10:55 AM    Specimen: Nasopharyngeal   Result Value Ref Range    COVID-19 Virus PCR to [facility] - Source Nasopharyngeal     COVID-19 Virus PCR to [facility] - Result       Test received-See reflex to IDDL test SARS CoV2 (COVID-19) Virus RT-PCR   SARS-CoV-2 COVID-19 Virus (Coronavirus) by PCR    Collection Time: 21 10:55 AM    Specimen: Nasopharyngeal   Result Value Ref Range    SARS-CoV-2 Virus Specimen Source Nasopharyngeal     SARS-CoV-2 PCR Result NEGATIVE     SARS-CoV-2 PCR Comment       Testing was performed using the Aptima SARS-CoV-2 Assay on the [facility] Instrument System.   Additional information about this Emergency Use Authorization (EUA) assay can be found via   the Lab Guide.     Treponema Abs w Reflex to RPR and Titer    Collection Time: 21  8:59 PM   Result Value Ref Range    Treponema Antibodies Nonreactive NR^Nonreactive   CBC with platelets differential    Collection Time: 21  8:59 PM   Result Value Ref Range    WBC 9.9 4.0 - 11.0 10e9/L    RBC Count 4.54 3.8 - 5.2 10e12/L    Hemoglobin 14.4 11.7 - 15.7 g/dL    Hematocrit 42.2 35.0 - 47.0 %    MCV 93 78 - 100 fl     MCH 31.7 26.5 - 33.0 pg    MCHC 34.1 31.5 - 36.5 g/dL    RDW 12.8 10.0 - 15.0 %    Platelet Count 120 (L) 150 - 450 10e9/L    Diff Method Automated Method     % Neutrophils 70.7 %    % Lymphocytes 19.7 %    % Monocytes 6.2 %    % Eosinophils 1.9 %    % Basophils 0.5 %    % Immature Granulocytes 1.0 %    Nucleated RBCs 0 0 /100    Absolute Neutrophil 7.0 1.6 - 8.3 10e9/L    Absolute Lymphocytes 2.0 0.8 - 5.3 10e9/L    Absolute Monocytes 0.6 0.0 - 1.3 10e9/L    Absolute Eosinophils 0.2 0.0 - 0.7 10e9/L    Absolute Basophils 0.1 0.0 - 0.2 10e9/L    Abs Immature Granulocytes 0.1 0 - 0.4 10e9/L    Absolute Nucleated RBC 0.0    ABO/Rh type and screen    Collection Time: 21  8:59 PM   Result Value Ref Range    ABO O     RH(D) Neg     Antibody Screen Neg     Test Valid Only At [facility]        Specimen Expires 2021    Rho (D) immune globulin (RhoGam) Lab Study    Collection Time: 21  2:19 AM   Result Value Ref Range    Rhogam Order Order received    ]    ASSESSMENT  Jhony Daniel is a 25 year old  POD# 0 s/p LTCS at 41+2 weeks gestation.  Hemoglobin to be drawn tomorrow morning.      PLAN  1) Routine post-partum/post-operative cares. Encourage ambulation. Consider removing wynn catheter later today vs tomorrow morning. Will wait until tomorrow morning to shower and remove bandage.  2) Rh negative.  Baby is also Rh negative so no Rhogam is needed  3) Plan to check post-op Hgb tomorrow  4) Pertussis vaccine to be given if indicated  5) Anticipate discharge on POD#2 vs POD#3.      Demi Lawler MD  Obstetrics, Gynecology and Infertility

## 2021-05-06 NOTE — PROGRESS NOTES
Pt is a 24yo  at 41+1 for MIL and is s/p cervidil induction  Pt feels same pressure that she has felt for 3 hours  SVE has been 7cm since 7pm  We have adjusted the pitocin and pt had an adequate contraction pattern. I didn't place an internal monitor because her contractions were already Q 2 min. Max pitocin is 15mU  FHR has been 130s reactive, no decels, moderate variability  Pt had been repositioned Q 20-30  Minutes with side laying release on both sides, peanut ball BL, forward leaning inversion and baby still feels OP and her SVE hasn't changed  I dw pt the R/B of a Prim CS and she and her  are in agreement to proceed with a CS. PT was told she can have infection, bleeding with a hemorrhage needing blood transfusion, injury to surrounding structures such as but not limited to bowel, bladder and blood vessels. Consent was signed    Lisa Pandey DO

## 2021-05-06 NOTE — PLAN OF CARE
Vital signs stable. Postpartum assessment WDL. Incision clean, dry, and intact. Pain controlled with tylenol and toradol. Benadryl given for itchiness. Gibbons patent and draining adequate amounts of urine. Tolerating clear liquids and crackers- diet advanced. Breastfeeding on cue with minimal staff assistance. Patient and infant bonding well. Will continue with current plan of care.

## 2021-05-06 NOTE — PLAN OF CARE
SVE at 1910 6/80%/-2.  FHTs 135 moderate variability, occasional mild variable decelerations.  Accelerations absent.   Patient comfortable with epidural.  Pitocin remains at 8 mu.  Dr. Pandey updated over phone at 1915.  Report given to Rosita Rosenbaum RN, at 1920.

## 2021-05-07 LAB — HGB BLD-MCNC: 11.9 G/DL (ref 11.7–15.7)

## 2021-05-07 PROCEDURE — 36415 COLL VENOUS BLD VENIPUNCTURE: CPT | Performed by: OBSTETRICS & GYNECOLOGY

## 2021-05-07 PROCEDURE — 250N000011 HC RX IP 250 OP 636: Performed by: OBSTETRICS & GYNECOLOGY

## 2021-05-07 PROCEDURE — 85018 HEMOGLOBIN: CPT | Performed by: OBSTETRICS & GYNECOLOGY

## 2021-05-07 PROCEDURE — 120N000012 HC R&B POSTPARTUM

## 2021-05-07 PROCEDURE — 250N000013 HC RX MED GY IP 250 OP 250 PS 637: Performed by: OBSTETRICS & GYNECOLOGY

## 2021-05-07 RX ORDER — OXYCODONE HYDROCHLORIDE 5 MG/1
5 TABLET ORAL EVERY 4 HOURS PRN
Qty: 15 TABLET | Refills: 0 | Status: SHIPPED | OUTPATIENT
Start: 2021-05-07 | End: 2022-08-08

## 2021-05-07 RX ORDER — ACETAMINOPHEN 325 MG/1
975 TABLET ORAL EVERY 6 HOURS
Qty: 40 TABLET | Refills: 1 | Status: SHIPPED | OUTPATIENT
Start: 2021-05-07

## 2021-05-07 RX ORDER — IBUPROFEN 800 MG/1
800 TABLET, FILM COATED ORAL EVERY 6 HOURS
Qty: 40 TABLET | Refills: 1 | Status: SHIPPED | OUTPATIENT
Start: 2021-05-07

## 2021-05-07 RX ORDER — ONDANSETRON 4 MG/1
4 TABLET, ORALLY DISINTEGRATING ORAL EVERY 6 HOURS PRN
Status: DISCONTINUED | OUTPATIENT
Start: 2021-05-07 | End: 2021-05-08 | Stop reason: HOSPADM

## 2021-05-07 RX ADMIN — ACETAMINOPHEN 975 MG: 325 TABLET, FILM COATED ORAL at 07:56

## 2021-05-07 RX ADMIN — SENNOSIDES AND DOCUSATE SODIUM 1 TABLET: 8.6; 5 TABLET ORAL at 07:56

## 2021-05-07 RX ADMIN — OXYCODONE HYDROCHLORIDE 5 MG: 5 TABLET ORAL at 03:02

## 2021-05-07 RX ADMIN — OXYCODONE HYDROCHLORIDE 5 MG: 5 TABLET ORAL at 07:21

## 2021-05-07 RX ADMIN — IBUPROFEN 800 MG: 400 TABLET ORAL at 13:51

## 2021-05-07 RX ADMIN — ACETAMINOPHEN 975 MG: 325 TABLET, FILM COATED ORAL at 20:49

## 2021-05-07 RX ADMIN — OXYCODONE HYDROCHLORIDE 5 MG: 5 TABLET ORAL at 11:49

## 2021-05-07 RX ADMIN — IBUPROFEN 800 MG: 400 TABLET ORAL at 20:49

## 2021-05-07 RX ADMIN — ONDANSETRON 4 MG: 4 TABLET, ORALLY DISINTEGRATING ORAL at 11:44

## 2021-05-07 RX ADMIN — SENNOSIDES AND DOCUSATE SODIUM 1 TABLET: 8.6; 5 TABLET ORAL at 20:49

## 2021-05-07 RX ADMIN — ACETAMINOPHEN 975 MG: 325 TABLET, FILM COATED ORAL at 15:25

## 2021-05-07 RX ADMIN — OXYCODONE HYDROCHLORIDE 5 MG: 5 TABLET ORAL at 23:21

## 2021-05-07 RX ADMIN — IBUPROFEN 800 MG: 400 TABLET ORAL at 07:56

## 2021-05-07 RX ADMIN — IBUPROFEN 800 MG: 400 TABLET ORAL at 02:25

## 2021-05-07 RX ADMIN — ACETAMINOPHEN 975 MG: 325 TABLET, FILM COATED ORAL at 02:25

## 2021-05-07 NOTE — PLAN OF CARE
Vital signs stable. Postpartum assessment WDL. Incision clean, dry, and intact. Pain controlled with tylenol, ibuprofen, and 5 mg of oxycodone. Using lanolin for nipple tenderness and ice pack to incision for comfort. Patient up ad crystal and voiding without difficulty. Breastfeeding on cue independently/with minimal staff assistance. Patient and infant bonding well. Will continue with current plan of care.

## 2021-05-07 NOTE — LACTATION NOTE
Routine visit with Jhony, FOB and baby.  Baby latched on well to the right breast.  Nose to nipple and deep latch obtained, lips flanged.   Discussed cluster feeding.  Outpatient resources reviewed.  No further questions at this time. Will follow as needed. Scarlett Cee BSN, RN, PHN, RNC-MNN, IBCLC

## 2021-05-07 NOTE — PROGRESS NOTES
PPD#1    S: Doing well. Pain controlled. Voiding and ambulating. Tolerating regular diet, +Flatus.    O: /70 (BP Location: Left arm)   Pulse 82   Temp 97.5  F (36.4  C) (Oral)   Resp 18   LMP 07/21/2020 (Approximate)   SpO2 99%   Breastfeeding Unknown   Gen - NAD  Abd - FF, NT  Inc - C/D/I  Ext - NT    Hgb 11.9    A/P: 26yo PPD#1 s/p LTCS  1. Routine cares  2. Enc ambulation  3. Possible discharge tomorrow, orders done    Treva Vo MD

## 2021-05-07 NOTE — PLAN OF CARE
Patient has stable vitals.  Fundus firm at U/1. Scant rubra flow.  Has voided once since wynn removed at 1400.  Pain well controlled with scheduled tylenol and prn ibuprofen and oxycodone.  Using ice pack prn to incision area.  Continue to work on breast feeding.

## 2021-05-07 NOTE — LACTATION NOTE
"Initial visit with Jhony, EBONY, and baby Mickey. Mickey has been breastfeeding well per Primary RN but has also been spitty. Last emesis contained a lot of colostrum. At time of LC visit, infant had just finished a feeding. Infant was gagging, no emesis, but did review bulb suction technique and how to position infant. Encouraged Jhony to call for assistance with latch/positioning if needed.      Highlighted breast feeding section in our \"Guide to Postpartum and  Care.\" Emphasized importance of skin to skin for enhancing early breastfeeding success!    Discussed  breastfeeding basics:   1) Watch for early feeding cues (licking lips, stirring or rooting, sucking movement with mouth, hands to mouth)  2) Feed infant on demand, a minimum of 8 times in 24 hours (recommended waking infant if it's been 3 hours since last feeding)  3) Techniques to waking a sleepy baby to nurse: (undress infant, change diaper if necessary, gently stroking bottom of feet and back, snuggling infant skin to skin, expressing colostrum).      Parents educated to \"typical\"  feeding patterns/behavior: Day 1 sleepiness (birthday nap) through cluster-feeding on day (night) 2. Educated on nutritive vs non-nutritive suckling patterns. Showed how to record infant feedings along with voids and stools in the provided feeding log.       Appreciative of visit.    Laila Awad RN, IBCLC            "

## 2021-05-07 NOTE — PLAN OF CARE
Patient complains of nausea this morning after taking ibuprofen and tylenol.  ODT zofran given with relief.  Patient also taking oxycodone PO for pain control.  Encouraged patient to eat when taking medication.  Patient up and showered and tolerated well.  Incisional dressing removed.  Scant amount of bleeding on left side of incision noted.  Clean pad applied to area.  Will continue to monitor. Assisted with breast feeding positioning prn.  Encouraged to call with questions/concerns.

## 2021-05-08 VITALS
TEMPERATURE: 98 F | SYSTOLIC BLOOD PRESSURE: 121 MMHG | HEART RATE: 75 BPM | OXYGEN SATURATION: 99 % | DIASTOLIC BLOOD PRESSURE: 77 MMHG | RESPIRATION RATE: 16 BRPM

## 2021-05-08 PROCEDURE — 250N000013 HC RX MED GY IP 250 OP 250 PS 637: Performed by: OBSTETRICS & GYNECOLOGY

## 2021-05-08 RX ORDER — NALOXONE HYDROCHLORIDE 0.4 MG/ML
0.2 INJECTION, SOLUTION INTRAMUSCULAR; INTRAVENOUS; SUBCUTANEOUS
Status: DISCONTINUED | OUTPATIENT
Start: 2021-05-08 | End: 2021-05-08 | Stop reason: HOSPADM

## 2021-05-08 RX ORDER — NALOXONE HYDROCHLORIDE 0.4 MG/ML
0.4 INJECTION, SOLUTION INTRAMUSCULAR; INTRAVENOUS; SUBCUTANEOUS
Status: DISCONTINUED | OUTPATIENT
Start: 2021-05-08 | End: 2021-05-08 | Stop reason: HOSPADM

## 2021-05-08 RX ADMIN — IBUPROFEN 800 MG: 400 TABLET ORAL at 08:35

## 2021-05-08 RX ADMIN — SENNOSIDES AND DOCUSATE SODIUM 2 TABLET: 8.6; 5 TABLET ORAL at 08:36

## 2021-05-08 RX ADMIN — SIMETHICONE 80 MG: 80 TABLET, CHEWABLE ORAL at 08:36

## 2021-05-08 RX ADMIN — IBUPROFEN 800 MG: 400 TABLET ORAL at 02:10

## 2021-05-08 RX ADMIN — ACETAMINOPHEN 975 MG: 325 TABLET, FILM COATED ORAL at 02:10

## 2021-05-08 NOTE — PLAN OF CARE
Jhony doing well; infant cluster feeding through the night. Breasts appear to be filling. Colostrum easily expressed. Minimal c/o discomfort. Burning at incision site when up and moving. Encourage use of abdominal binder. Staples removed. Discharge instructions reviewed; supportive  present.

## 2021-05-08 NOTE — PLAN OF CARE
Vss, RA.  LS clear. BS present. UO adequate.  Incision intact with steri strips.  Breastfeeding frequently overnight.  Tylenol,ibuprofen, and oxycodone controlling pain adequately.   bedside and supportive.

## 2021-05-08 NOTE — PLAN OF CARE
Vital signs are stable. Takes ibuprofen and tylenol for discomfort this shift.   Encouraged patient to eat when taking medication.  Incision is clean dry intact with steri-strip an staples/  Breastfeeding independently.  Will continue to monitor and encouraged to call with questions/concerns.

## 2021-05-08 NOTE — PROGRESS NOTES
POD#2    S: Doing well. Pain controlled. Voiding and ambulating +Flatus, tolerating regular diet.    O: /77   Pulse 75   Temp 98  F (36.7  C) (Oral)   Resp 16   LMP 07/21/2020 (Approximate)   SpO2 99%   Breastfeeding Unknown   Gen - NAD  Abd - FF, NT  Inc - C/D/I  Ext - NT    A/P: 24yo POD#2 s/p LTCS  1. Routine cares  2. Home today, instructions reviewed    Treva Vo MD

## 2021-05-08 NOTE — LACTATION NOTE
Routine visit with Jhony, FOB and baby.    Breastfeeding general information reviewed.   Continues to nurse well per mom and cluster fed all night.  Getting ready for discharge.  Plan: Watch for feeding cues and feed every 2-3 hours and/or on demand. Continue to use feeding log to track intake and appropriate voids and stools. Take feeding log to first follow up appointment or weight check. Encourage skin to skin to promote frequent feedings, thermoregulation and bonding. Follow-up with healthcare provider or lactation consultant for questions or concerns.     Instructed on signs/symptoms of engorgement/ plugged ducts and mastitis.  Instructed on comfort measures and when to call MD.  Outpatient resources given. No further questions at this time.   Will follow as needed.   Scarlett Cee BSN, RN, PHN, RNC-MNN, IBCLC

## 2021-05-10 NOTE — DISCHARGE SUMMARY
Date of Admission: 5/5/2021    Date of Discharge: 5/8/2021    Admission Diagnosis: MIL for post dates    Discharge Diagnosis: same with delivered    Operations/Procedures Performed: Prim LTCS    Complications: none    Hospital Course: The patient was admitted for MIL for postdates. Please see operative report for further details. EBL was 680. Hemoglobin on POD#1 was 11.9. Patient was discharged home on POD# 2. At the time of discharge, she was voiding, ambulating and tolerating a regular diet. Her pain was well controlled with PO pain meds. Staples were removed prior to discharge.    Discharge Plans:  1. Pt was instructed to call with pain not controlled with PO pain meds, temp > 100.4, bleeding > 1 pad/hr for 2hrs, or concerns about her incision.  2. Pt was instructed no lifting > 10lbs x6wks, no driving x2 wks or while on narcotics, and pelvic rest x6 wks.  3. Pt will return to clinic in 2 wks for post-op check and 6 wks for postpartum check.    Lisa Pandey DO

## 2021-09-05 ENCOUNTER — HEALTH MAINTENANCE LETTER (OUTPATIENT)
Age: 26
End: 2021-09-05

## 2022-06-11 ENCOUNTER — HEALTH MAINTENANCE LETTER (OUTPATIENT)
Age: 27
End: 2022-06-11

## 2022-08-08 ENCOUNTER — HOSPITAL ENCOUNTER (EMERGENCY)
Facility: CLINIC | Age: 27
Discharge: HOME | End: 2022-08-08
Attending: PHYSICIAN ASSISTANT | Admitting: PHYSICIAN ASSISTANT
Payer: COMMERCIAL

## 2022-08-08 VITALS
OXYGEN SATURATION: 100 % | WEIGHT: 103.2 LBS | RESPIRATION RATE: 16 BRPM | DIASTOLIC BLOOD PRESSURE: 83 MMHG | HEART RATE: 116 BPM | SYSTOLIC BLOOD PRESSURE: 136 MMHG | BODY MASS INDEX: 18.99 KG/M2 | HEIGHT: 62 IN | TEMPERATURE: 99 F

## 2022-08-08 DIAGNOSIS — G47.00 INSOMNIA, UNSPECIFIED TYPE: ICD-10-CM

## 2022-08-08 DIAGNOSIS — F42.9 OBSESSIVE-COMPULSIVE DISORDER, UNSPECIFIED TYPE: ICD-10-CM

## 2022-08-08 DIAGNOSIS — F41.1 GAD (GENERALIZED ANXIETY DISORDER): ICD-10-CM

## 2022-08-08 LAB — SARS-COV-2 RNA RESP QL NAA+PROBE: NEGATIVE

## 2022-08-08 PROCEDURE — U0005 INFEC AGEN DETEC AMPLI PROBE: HCPCS | Performed by: EMERGENCY MEDICINE

## 2022-08-08 PROCEDURE — U0005 INFEC AGEN DETEC AMPLI PROBE: HCPCS | Performed by: PHYSICIAN ASSISTANT

## 2022-08-08 PROCEDURE — 90791 PSYCH DIAGNOSTIC EVALUATION: CPT

## 2022-08-08 PROCEDURE — 250N000013 HC RX MED GY IP 250 OP 250 PS 637: Performed by: NURSE PRACTITIONER

## 2022-08-08 PROCEDURE — 99284 EMERGENCY DEPT VISIT MOD MDM: CPT | Mod: 25 | Performed by: NURSE PRACTITIONER

## 2022-08-08 PROCEDURE — 99285 EMERGENCY DEPT VISIT HI MDM: CPT | Mod: 25

## 2022-08-08 PROCEDURE — C9803 HOPD COVID-19 SPEC COLLECT: HCPCS

## 2022-08-08 RX ORDER — LORAZEPAM 1 MG/1
1 TABLET ORAL ONCE
Status: COMPLETED | OUTPATIENT
Start: 2022-08-08 | End: 2022-08-08

## 2022-08-08 RX ORDER — LORAZEPAM 0.5 MG/1
.5-1 TABLET ORAL EVERY 6 HOURS PRN
Qty: 14 TABLET | Refills: 0 | Status: SHIPPED | OUTPATIENT
Start: 2022-08-08

## 2022-08-08 RX ORDER — HYDROXYZINE HYDROCHLORIDE 25 MG/1
25-50 TABLET, FILM COATED ORAL EVERY 6 HOURS PRN
Qty: 60 TABLET | Refills: 0 | Status: SHIPPED | OUTPATIENT
Start: 2022-08-08

## 2022-08-08 RX ORDER — QUETIAPINE FUMARATE 50 MG/1
50 TABLET, FILM COATED ORAL
Qty: 30 TABLET | Refills: 0 | Status: SHIPPED | OUTPATIENT
Start: 2022-08-08

## 2022-08-08 RX ORDER — ESCITALOPRAM OXALATE 5 MG/1
5 TABLET ORAL DAILY
Status: DISCONTINUED | OUTPATIENT
Start: 2022-08-09 | End: 2022-08-08

## 2022-08-08 RX ORDER — ESCITALOPRAM OXALATE 5 MG/1
5 TABLET ORAL DAILY
Status: DISCONTINUED | OUTPATIENT
Start: 2022-08-08 | End: 2022-08-09 | Stop reason: HOSPADM

## 2022-08-08 RX ORDER — ESCITALOPRAM OXALATE 5 MG/1
5 TABLET ORAL DAILY
Qty: 30 TABLET | Refills: 0 | Status: SHIPPED | OUTPATIENT
Start: 2022-08-08

## 2022-08-08 RX ADMIN — LORAZEPAM 1 MG: 1 TABLET ORAL at 21:49

## 2022-08-08 RX ADMIN — ESCITALOPRAM 5 MG: 5 TABLET, FILM COATED ORAL at 21:49

## 2022-08-08 ASSESSMENT — ENCOUNTER SYMPTOMS
VOMITING: 0
NERVOUS/ANXIOUS: 1
SHORTNESS OF BREATH: 0
DIFFICULTY URINATING: 0
FEVER: 0
BLOOD IN STOOL: 0

## 2022-08-08 NOTE — ED NOTES
Bed: SHRL02  Expected date: 8/8/22  Expected time: 3:55 PM  Means of arrival:   Comments:  Triage AB maral cortes

## 2022-08-08 NOTE — ED TRIAGE NOTES
Pt presents with concerns for increasing anxiety and depression over last 15 months since birth of child. Pt reports that she has always had anxiety but able to manage without medications. One trial several years ago      Triage Assessment     Row Name 08/08/22 6277       Triage Assessment (Adult)    Airway WDL WDL       Skin Circulation/Temperature WDL    Skin Circulation/Temperature WDL WDL       Cardiac WDL    Cardiac WDL WDL       Cognitive/Neuro/Behavioral WDL    Cognitive/Neuro/Behavioral WDL WDL
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
## 2022-08-08 NOTE — CONSULTS
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 fantasy, many times painful, always changing. By allowing ourselves this simple recognition, we begin to accept that we will never be able to control our experiences, but that we can transform our relationship to them. This changes everything.     Printed from Juanita Frost s book, Real Love.

## 2022-08-08 NOTE — ED PROVIDER NOTES
History     Chief Complaint:  Psychiatric Evaluation     28 y/o female presents with her SO for evaluation of overwhelming anxiety.  This has been ongoing for the past 2 weeks.  Hx of anxiety and did take medication about 5 years ago but none since.  Has 15 month old son.  Past few weeks has felt flat with emotions towards him.  No self harm thoughts or actions or actions or thoughts of harm to others.  Has significant social stressors right now with family members (recently had to cut mother off due to mother's ETOH abuse).     No fevers  No CP/dyspnea  Had upset stomach when traveling a few weeks ago  No melena  No bloody stools  No urinary irritative complaints    Hx of anxiety  Has not been Dx with depression, but this is pts primary concern this evening    PCP established, has appt scheduled for tomorrow  SO at [facility]  Rarely drinks ETOH  No drug use        The history is provided by the patient and medical records. No  was used.      ROS:  Review of Systems   Constitutional: Negative for fever.   Respiratory: Negative for shortness of breath.    Cardiovascular: Negative for chest pain.   Gastrointestinal: Negative for blood in stool and vomiting.   Genitourinary: Negative for difficulty urinating.   Psychiatric/Behavioral: The patient is nervous/anxious.    All other systems reviewed and are negative.    Allergies:  Sulfa Drugs  Amoxicillin  Penicillins     Medications:    acetaminophen (TYLENOL) 325 MG tablet  ibuprofen (ADVIL/MOTRIN) 800 MG tablet  oxyCODONE (ROXICODONE) 5 MG tablet  Prenatal Vit-Fe Fumarate-FA (PRENATAL MULTIVITAMIN W/IRON) 27-0.8 MG tablet  Probiotic Product (PROBIOTIC DAILY PO)  vitamin C (ASCORBIC ACID) 100 MG tablet      Past Medical History:    No past medical history on file.    Past Surgical History:    Past Surgical History:   Procedure Laterality Date      SECTION N/A 2021    Procedure:  SECTION;  Surgeon: Lisa Pandey DO;  Location:  "[facility]     HEAD & NECK SURGERY      [place] teeth      Family History:    family history is not on file.    Social History:   reports that she has never smoked. She has never used smokeless tobacco. She reports previous alcohol use. She reports that she does not use drugs.  PCP: Simran Blake     Physical Exam     Patient Vitals for the past 24 hrs:   BP Temp Temp src Pulse Resp SpO2 Height Weight   08/08/22 1550 (!) 134/100 98.4  F (36.9  C) Oral (!) 125 22 100 % 1.575 m (5' 2\") 47.6 kg (105 lb)      Physical Exam  Vitals and nursing note reviewed.   Constitutional:       General: She is not in acute distress.     Appearance: Normal appearance. She is not ill-appearing, toxic-appearing or diaphoretic.   HENT:      Head: Normocephalic.      Right Ear: External ear normal.      Left Ear: External ear normal.      Mouth/Throat:      Comments: Mask in place, clear speech.   Eyes:      Conjunctiva/sclera: Conjunctivae normal.   Cardiovascular:      Rate and Rhythm: Regular rhythm. Tachycardia present.      Pulses: Normal pulses.      Heart sounds: Normal heart sounds.   Pulmonary:      Effort: Pulmonary effort is normal. No respiratory distress.      Breath sounds: Normal breath sounds.   Abdominal:      General: There is no distension.      Palpations: Abdomen is soft.      Tenderness: There is no abdominal tenderness. There is no guarding or rebound.   Musculoskeletal:         General: Normal range of motion.      Cervical back: Normal range of motion and neck supple. No rigidity.   Skin:     General: Skin is warm.      Capillary Refill: Capillary refill takes less than 2 seconds.   Neurological:      General: No focal deficit present.      Mental Status: She is alert.   Psychiatric:         Mood and Affect: Mood normal.         Behavior: Behavior normal.         Thought Content: Thought content normal.         Judgment: Judgment normal.      Comments: Anxious appearing but cooperative and directable. Not " responding to internal stimuli.        Emergency Department Course     Laboratory:  Labs Ordered and Resulted from Time of ED Arrival to Time of ED Departure   COVID-19 VIRUS (CORONAVIRUS) BY PCR - Normal       Result Value    SARS CoV2 PCR Negative        Emergency Department Course:       Reviewed:  I reviewed nursing notes, vitals and past medical history    Assessments:  : I obtained history and examined the patient as noted above.     Disposition:  The patient was transferred to [facility].     Impression & Plan      Medical Decision Makin y/o female presents with her SO for evaluation of overwhelming anxiety.  This has been ongoing for the past 2 weeks.  Hx of anxiety and did take medication about 5 years ago but none since.  Has 15 month old son.  Past few weeks has felt flat with emotions towards him.  No self harm thoughts or actions or actions or thoughts of harm to others.  Has significant social stressors right now with family members (recently had to cut mother off due to mother's ETOH abuse).     Exam as above.  Anxious appearing but directable.  Vitals noted, suspect tachycardia reflexion of anxiety.  [facility]  She and  are happy with plan.     Diagnosis:  1. Anxiety     2022   Claudia Galindo PA-C Medure, Leah M, PA-C  [phone]

## 2022-08-09 NOTE — DISCHARGE INSTRUCTIONS
Called and spoke with Bernie. She did advise that she found another form that was not included in the original packet, she is faxing to us now.    Medication Instructions:  Daily Anti-Anxiety Medication: escitalopram (lexapro) 5 mg daily.  If GI side effects, or increased physical symptoms of anxiety persist more than 5 days-recommend taking 1/2 a pill for 2.5 mg daily until side effects subside.  -Sleep Regime: Take First: quetiapine (seroquel) 50 mg nightly as needed.  Recommend taking nightly for at least a week to re-establish normal sleep pattern. It is absolutely ok to take it longer! If 50 mg tablet makes you feel too groggy in the morning-the pill can be cut in half to make 25 mg.  -Hydroxyzine (atarax) 25-50 mg every 6 hours as needed for anxiety. A great option to take during the day at the smaller dose. Once again-a 25 mg tablet can be sliced in half for 12.5 mg if feeling oversedated on 25 mg. The larger dose of 50 mg may be helpful at night. Can be taken with Seroquel.  -Lorazepam 1 mg. This is the dose you received on [facility]. Your insurance covers the 0.5 mg tablets so may take 1 or 2 depending on the severity and tolerance of your symptoms.              Aftercare Plan    Follow-up appointments scheduled:    *Please arrive 20 minutes early to your appointments. Remember to bring your insurance card and ID.       If I am feeling unsafe or I am in a crisis, I will:     Contact my established care providers:   Primary Care Doctor: Dr. Simran Blake at [facility] ([phone])    Call the National Suicide Prevention Lifeline (526) or the crisis text line (113820).   Go to the nearest emergency room.   Call [phone].     Warning signs that I or other people might notice when a crisis is developing for me: Feeling anxious, having dark thoughts, difficulty eating enough food or water, lack of sleep.     Things I am able to do on my own to cope or help me feel better:   -Take a deep breath and sit down if needed. Think before acting.   -Download a meditation moraima and spend 15-20 minutes per day mediating/relaxing. Some apps to download  "include Calm, Headspace and Insight Timer. All of these apps have free version.     Things that I am able to do with others to cope or help me better:   -Commit to 30 minutes of self care daily. This can be as simple as taking a shower, going for a walk, cooking a meal, reading, writing, etc.   -Use community resources, including hotline numbers, [facility], and support meetings.     Things I can use or do for distraction:   -Call a friend or family member.   -Spend time outside.   -Go for a walk.   -Exercise  -Do chores.   -Do a project or favorite activity.   -Listen to music.   -Read.   -Journal your feelings.   -Meditate.     Changes I can make to support my mental health and wellness:   -Get at least 6-8 hours of sleep each night.   -Eat 3 nutritious meals per day.   -Take all of your medications as prescribed.   -Attend scheduled mental health therapy and psychiatric appointments and follow all recommendations.   -Maintain a daily schedule/routine.   -Practice deep breathing skills.   -Refrain from taking mood altering chemicals not currently prescribed to me.     People in my life that I can ask for help: My , my father, stepmother, and friends.     Your [facility] has a mental health crisis team you can call 24/7: [facility] Mobile Crisis  [phone]    Crisis Lines  Crisis Text Line  Text 022937  You will be connected with a trained live crisis counselor to provide support.  Por sania, texto  ROD a 131609 o texto a 442-AYUDAME en WhatsApp  The [employer] (LGBTQ Youth Crisis Line)  5.773.539.3714  text START to 443-829    Community Resources  Fast Tracker  Linking people to mental health and substance use disorder resources  fasttrackermn.org   [place] Mental Health Warm Line  Peer to peer support  Monday thru Saturday, 12 pm to 10 pm  702.404.5198 or 0.081.633.5000  Text \"Support\" to 81252  National [place] on Mental Illness (KOLE)  984.657.4785 or 1.888.KOLE.HELPS    Mental " "Health Apps (all of these apps have a free version)  My3  https://myCoinKeeperpp.org/  VirtualHopeBox  https://Glyde/apps/virtual-hope-box/  Calm  Headspace  Insight Timer  Calm Harm    Crisis Lines  Crisis Text Line  Text 791800  You will be connected with a trained live crisis counselor to provide support.  Por espanol, texto  ROD a 439116 o texto a 442-AYUDAME en WhatsApp  National Hope Line  1.800.SUICIDE [9702696]    Community Resources  Fast Tracker  Linking people to mental health and substance use disorder resources  Easy Tempon.org   [place] Mental Health Warm Line  Peer to peer support  Monday thru Saturday, 12 pm to 10 pm  854.641.0994 or 3.316.306.6140  Text \"Support\" to 43670  National [place] on Mental Illness (KOLE)  143.194.9316 or 1.888.KOLE.HELPS    Additional Information  Today you were seen by a licensed mental health professional through Triage and Transition services, [facility] ([facility])  for a crisis assessment in the Emergency Department at [facility].  It is recommended that you follow up with your established providers (psychiatrist, mental health therapist, and/or primary care doctor - as relevant) as soon as possible. Coordinators from [facility] will be calling you in the next 24-48 hours to ensure that you have the resources you need.  You can also contact [facility] coordinators directly at [phone]. You may have been scheduled for or offered an appointment with a mental health provider. [facility] maintains an extensive network of licensed behavioral health providers to connect patients with the services they need.  We do not charge providers a fee to participate in our referral network.  We match patients with providers based on a patient's specific needs, insurance coverage, and location.  Our first effort will be to refer you to a provider within your care system, and will utilize providers outside your care system as needed.         TIPP?stands " for?Temperature,?Intense exercise,?Paced breathing, and?Paired muscle relaxation.     TEMPERATURE     When we re upset, our bodies often feel hot. To counter this, splash your face with cold water, hold an ice cube, or let the car s AC blow on your face. Changing your body temperature will help you cool down--both physically and emotionally.     INTENSE EXERCISE     Do intense exercise to match your intense emotion. You re not a marathon runner? That s okay, you don t need to be. Sprint down to the end of the street, jump in the pool for a few laps, or do jumping jacks until you ve tired yourself out. Increasing oxygen flow helps decrease stress levels. Plus, it s hard to stay dangerously upset when you re exhausted.     PACED BREATHING     Even something as simple as controlling your breath can have a profound impact on reducing emotional pain. There are many different types of breathing exercises. If you have a favorite, breathe it out. If you don t, try a technique called  box breathing . Each breath interval will be four seconds long. Take in air four seconds, hold it in four seconds, breathe out four, and hold four. And then start again. Continue to focus on this breathing pattern until you feel more calm. Steady breathing reduces your body s fight or flight response.     PAIRED MUSCLE RELAXATION     The science of paired muscle relaxation is fascinating. When you tighten a voluntary muscle, relax it, and allow it to rest, the muscle will become more relaxed than it was before it was tightened. Relaxed muscles require less oxygen,?so your breathing and heart rate will slow down. Try this technique by focusing on a group of muscles, such as the muscles in your arms. Tighten the muscles as much as you can for five seconds. Then let go of the tension. Let the muscles relax, and you ll begin to relax, as well.         The RAIN Practice     RAIN is an acronym for a practice specifically geared to ease emotional  confusion and suffering. When a negative or thorny feeling comes up, we pause, remember the four steps cued by the letters, and begin to pay attention in a new way.     R -- Recognize: It is impossible to deal with an emotion--to be resilient in the face of difficulty--unless we acknowledge that we re experiencing it. So the first step is simply to notice what is coming up. Suppose you ve had a conversation with a friend that leaves you feeling queasy or agitated. You don t try to push away or ignore your discomfort. Instead, you look more closely. Oh, you might say to yourself, this feels like anger. Then this might be followed quickly by another thought: And I notice I am judging myself for being angry.     A -- Acknowledge: The second step is an extension of the first--you accept the feeling and allow it to be there. Put another way, you give yourself permission to feel it. You remind yourself that you don t have the power to successfully declare,  I shouldn t have such hateful feelings about a friend,  or  I ve got to be less sensitive.  Sometimes I ask students to imagine each thought and emotion as a visitor knocking at the door of their house. The thoughts don t live there; you can greet them, acknowledge them, and watch them go. Rather than trying to dismiss anger and self-judgment as  bad  or  wrong,  simply rename them as  painful.  This is the entry into self-compassion--you can see your thoughts and emotions arise and create space for them even if they are uncomfortable. You don t take hold of your anger and fixate on it, nor do you treat it as an enemy to be suppressed. It can simply be.     As we get closer to it, an uncomfortable emotion becomes less opaque and solid. We focus less on labeling the discomfort and more on gaining insight.     I -- Investigate: Now you begin to ask questions and explore your emotions with a sense of openness and curiosity. This feels quite different from when we are  fueled by obsessiveness or by a desire for answers or blame. When we re caught up in a reaction, it s easy to fixate on the trigger and say to ourselves,  I m so mad at so-and-so that I m going to tell everyone what he did and destroy him!  rather than examining the emotion itself. There is so much freedom in allowing ourselves to cultivate curiosity and move closer to a feeling, rather than away from it. We might explore how the feeling manifests itself in our bodies and also look at what the feeling contains. Many strong emotions are actually intricate tapestries woven of various strands. Anger, for example, commonly includes moments of sadness, helplessness, and fear. As we get closer to it, an uncomfortable emotion becomes less opaque and solid. We focus less on labeling the discomfort and more on gaining insight. Again, we do not wallow, nor do we repress. Remember that progress doesn t mean that the negative emotions don t come up. It s that instead of feeling hard as steel, they become gauzy, transparent, and available for investigation.     N -- Non-identify: In the final step of RAIN, we consciously avoid being defined by (identified with) a particular feeling, even as we may engage with it. Feeling angry with a particular person, in a particular conversation, about a particular situation is very different from telling yourself,  I am an angry person and always will be.  You permit yourself to see your own anger, your own fear, your own resentment--whatever is there--and instead of spiraling down into judgment ( I m such a terrible person ), you make a gentle observation, something like,  Oh. This is a state of suffering.  This opens the door to a compassionate relationship with yourself, which is the real foundation of a compassionate relationship with others.     By allowing ourselves this simple recognition, we begin to accept that we will never be able to control our experiences, but that we can  transform our relationship to them. This changes everything.     We cannot will what thoughts and feelings arise in us. But we can recognize them as they are--sometimes recurring, sometimes frustrating, sometimes filled with fantasy, many times painful, always changing. By allowing ourselves this simple recognition, we begin to accept that we will never be able to control our experiences, but that we can transform our relationship to them. This changes everything.     Printed from Juanita Frost s book, Real Love.

## 2022-08-09 NOTE — PROGRESS NOTES
Patient agreeable to discharge plan. Discharge instructions reviewed with patient including follow-up care plan. Medications reviewed. Reviewed safety plan and outpatient resources. Denies SI and HI. All belongings that were brought into the hospital have been returned to patient. Escorted off the unit at 2215 accompanied by [facility] staff. Discharged to home via private transportation.

## 2022-08-09 NOTE — ED PROVIDER NOTES
"[facility] - Psychiatric Consultation  [facility] Emergency Department    Jhony Daniel MRN: [id number]   Age: 27 year old YOB: 1995     History     Chief Complaint   Patient presents with     Psychiatric Evaluation     HPI  Jhony Daniel is a 27 year old female with history notable for BRYN and, depression who presents to the ED with insomnia, poor appetite and obsessions in the context of worsening anxiety symptoms further complicated by being 15-month postpartum and experiencing multiple life stressors.  Patient was evaluated by the ED provider, who medically cleared patient to transfer to [facility] for psychiatric assessment, this is reviewed along with all pertinent labs and tests performed.    On examination, patient describes herself as an anxious person, a perfectionist and worrier most her life. She has one past medication trial with Zoloft 5 years ago when she was under considerable stress and experienced similar symptoms, yet not to the same intensity as now.  She states she did not take the medication for an extended period of time as she is sensitive to side effects and generally does not like taking medications. She feels her symptoms became more tolerable as the stress level decreased.    What prompted her admission to the ED was experiencing a vivid vision of her harming her family. This occurred about 5 days ago in her sleep. She has now obsessed over this thought and image worrying she may loose her mind and actually do something. She has not been able to eat or sleep. She feels like she is \"zoning out\" and has difficulty concentrating or thinking of anything else.  She does not endorse any suicidal thoughts. She does not want to harm her family and is seeking mental health advice.      Past Medical History  No past medical history on file.  Past Surgical History:   Procedure Laterality Date      SECTION N/A 2021    Procedure:  SECTION;  Surgeon: Dannie" "Lisa Lucasamarjit DO;  Location:  L+D     HEAD & NECK SURGERY      [place] teeth     acetaminophen (TYLENOL) 325 MG tablet  escitalopram (LEXAPRO) 5 MG tablet  hydrOXYzine (ATARAX) 25 MG tablet  ibuprofen (ADVIL/MOTRIN) 800 MG tablet  LORazepam (ATIVAN) 0.5 MG tablet  Prenatal Vit-Fe Fumarate-FA (PRENATAL MULTIVITAMIN W/IRON) 27-0.8 MG tablet  Probiotic Product (PROBIOTIC DAILY PO)  QUEtiapine (SEROQUEL) 50 MG tablet  vitamin C (ASCORBIC ACID) 100 MG tablet      Allergies   Allergen Reactions     Sulfa Drugs Shortness Of Breath     Amoxicillin Hives     Penicillins Hives     Family History  No family history on file.  Social History   Social History     Tobacco Use     Smoking status: Never Smoker     Smokeless tobacco: Never Used   Substance Use Topics     Alcohol use: Not Currently     Drug use: Never      Past medical history, past surgical history, medications, allergies, family history, and social history were reviewed with the patient. No additional pertinent items.       Review of Systems  A complete review of systems was performed with pertinent positives and negatives noted in the HPI, and all other systems negative.    Physical Examination   BP: (!) 134/100  Pulse: (!) 125  Temp: 98.4  F (36.9  C)  Resp: 22  Height: 157.5 cm (5' 2\")  Weight: 47.6 kg (105 lb)  SpO2: 100 %    Physical Exam  General: Appears stated age.   Neuro: Alert and fully oriented. Extremities appear to demonstrate normal strength on visual inspection.   Integumentary/Skin: no rash visualized, normal color    Psychiatric Examination   Appearance: awake, alert, fatigued and thin  Attitude:  cooperative  Eye Contact:  good  Mood:  anxious  Affect:  intensity is heightened  Speech:  pressured speech and rambling  Psychomotor Behavior:  no evidence of tardive dyskinesia, dystonia, or tics  Thought Process:  logical, linear and goal oriented  Associations:  no loose associations  Thought Content:  no evidence of suicidal ideation or homicidal " ideation, no evidence of psychotic thought and obsessions present  Insight:  good  Judgement:  intact  Oriented to:  time, person, and place  Attention Span and Concentration:  intact  Recent and Remote Memory:  intact  Language: able to name/identify objects without impairment  Fund of Knowledge: intact with awareness of current and past events    ED Course        Labs Ordered and Resulted from Time of ED Arrival to Time of ED Departure   COVID-19 VIRUS (CORONAVIRUS) BY PCR - Normal       Result Value    SARS CoV2 PCR Negative         Assessments & Plan (with Medical Decision Making)   Patient presenting with exacerbation of anxiety symptoms in the context of BRYN with probable OCD traits further complicated by lack of sleep, appetite and external stressors.. Nursing notes reviewed noting no acute issues.     I have reviewed the assessment completed by the [facility].     After a period of working with the treatment team on the [facility] unit, the patient's mental state improved to allow a safe transition to outpatient care. After counseling on the diagnosis, work-up, and treatment plan, the patient was discharged. Close follow-up with a psychiatrist and/or therapist was recommended and community psychiatric resources were provided. Patient is to return to the ED if any urgent or potentially life-threatening concerns.     At the time of discharge, the patient's acute suicide risk was determined to be low due to the following factors: Reduction in the intensity of mood/anxiety symptoms that preceded the admission, denial of suicidal thoughts, denies feeling helpless or helpless, not currently under the influence of alcohol or illicit substances, denies experiencing command hallucinations, no immediate access to firearms. The patient's acute risk could be higher if noncompliant with their treatment plan, medications, follow-up appointments or using illicit substances or alcohol. Protective factors include: social supports,  children, stable housing, employment, Evangelical beliefs.    Preliminary diagnosis:    ICD-10-CM    1. BRYN (generalized anxiety disorder)  F41.1    2. Insomnia, unspecified type  G47.00    3. Obsessive-compulsive disorder, unspecified type  F42.9     Rule Out-requires futher monitoring        Treatment Plan:  -Discharge home with safety plan.  -Start Lexapro 5 mg daily for anxiety. Will start slow at 5 mg and then increase to 10 mg to avoid potential side effects,  -Will give 1 mg ativan now due to heightened anxiety.  -Prescription for ativan 0.5-1 mg every 6 hours as needed. 14 pills provided. For anxiety and sleep.  -Atarax 25-50 mg every 6 hours as needed for anxiety or sleep.  -Seroquel 50 mg nightly as needed for sleep. Encouraged regular use until she feels stabilized.  -Medication education provided this visit includes, rationale for medication, importance of compliance, medication interactions, and common side effects. Patient agreeable.  -Referral for therapy and psychiatry.    --  KENNETH Rodriguez CNP   [facility] EMERGENCY DEPT  [facility] Unit  8/8/2022      Radha Holm APRN CNP  08/09/22 [id number]

## 2022-10-22 ENCOUNTER — HEALTH MAINTENANCE LETTER (OUTPATIENT)
Age: 27
End: 2022-10-22

## 2023-06-01 ENCOUNTER — HEALTH MAINTENANCE LETTER (OUTPATIENT)
Age: 28
End: 2023-06-01

## 2024-06-02 ENCOUNTER — HEALTH MAINTENANCE LETTER (OUTPATIENT)
Age: 29
End: 2024-06-02

## 2025-06-15 ENCOUNTER — HEALTH MAINTENANCE LETTER (OUTPATIENT)
Age: 30
End: 2025-06-15

## (undated) DEVICE — SOL WATER IRRIG 1000ML BOTTLE 07139-09

## (undated) DEVICE — SOL NACL 0.9% IRRIG 1000ML BOTTLE 07138-09

## (undated) DEVICE — DRSG GAUZE 4X4" TOPPER

## (undated) DEVICE — LINEN C-SECTION 5415

## (undated) DEVICE — ESU GROUND PAD UNIVERSAL W/O CORD

## (undated) DEVICE — SU VICRYL 3-0 CT-1 36" J338H

## (undated) DEVICE — STPL SKIN PROXIMATE 35 WIDE PMW35

## (undated) DEVICE — CATH TRAY FOLEY 16FR BARDEX W/DRAIN BAG STATLOCK 300316A

## (undated) DEVICE — SUCTION CANISTER MEDIVAC LINER 3000ML W/LID 65651-530

## (undated) DEVICE — SU VICRYL 0 CT-1 27" J340H

## (undated) DEVICE — PREP CHLORAPREP 26ML TINTED ORANGE  260815

## (undated) DEVICE — GLOVE PROTEXIS W/NEU-THERA 6.5  2D73TE65

## (undated) DEVICE — GLOVE PROTEXIS BLUE W/NEU-THERA 6.5  2D73EB65

## (undated) DEVICE — BLADE CLIPPER 4406

## (undated) DEVICE — DEVICE FETAL PILLOW BALLOON SIL PF-010

## (undated) DEVICE — DRSG TELFA 3X8" 1238

## (undated) DEVICE — PACK C-SECTION LF PL15OTA83B

## (undated) DEVICE — DRSG STERI STRIP 1/4X3" R1541

## (undated) DEVICE — SU MONOCRYL 0 CTX 36" Y398H

## (undated) RX ORDER — LIDOCAINE HCL/EPINEPHRINE/PF 2%-1:200K
VIAL (ML) INJECTION
Status: DISPENSED
Start: 2021-05-06

## (undated) RX ORDER — METHYLERGONOVINE MALEATE 0.2 MG/ML
INJECTION INTRAVENOUS
Status: DISPENSED
Start: 2021-05-06

## (undated) RX ORDER — TRANEXAMIC ACID 10 MG/ML
INJECTION, SOLUTION INTRAVENOUS
Status: DISPENSED
Start: 2021-05-06

## (undated) RX ORDER — OXYTOCIN/0.9 % SODIUM CHLORIDE 30/500 ML
PLASTIC BAG, INJECTION (ML) INTRAVENOUS
Status: DISPENSED
Start: 2021-05-06

## (undated) RX ORDER — MORPHINE SULFATE 1 MG/ML
INJECTION, SOLUTION EPIDURAL; INTRATHECAL; INTRAVENOUS
Status: DISPENSED
Start: 2021-05-06

## (undated) RX ORDER — ONDANSETRON 2 MG/ML
INJECTION INTRAMUSCULAR; INTRAVENOUS
Status: DISPENSED
Start: 2021-05-06